# Patient Record
Sex: MALE | Race: BLACK OR AFRICAN AMERICAN | Employment: OTHER | URBAN - METROPOLITAN AREA
[De-identification: names, ages, dates, MRNs, and addresses within clinical notes are randomized per-mention and may not be internally consistent; named-entity substitution may affect disease eponyms.]

---

## 2018-07-25 ENCOUNTER — HOSPITAL ENCOUNTER (EMERGENCY)
Age: 53
Discharge: HOME | End: 2018-07-25
Attending: EMERGENCY MEDICINE
Payer: COMMERCIAL

## 2018-07-25 VITALS
RESPIRATION RATE: 14 BRPM | OXYGEN SATURATION: 99 % | TEMPERATURE: 98 F | DIASTOLIC BLOOD PRESSURE: 99 MMHG | HEIGHT: 70 IN | BODY MASS INDEX: 33.28 KG/M2 | SYSTOLIC BLOOD PRESSURE: 162 MMHG | HEART RATE: 72 BPM | WEIGHT: 232.5 LBS

## 2018-07-25 DIAGNOSIS — Z76.0 ENCOUNTER FOR MEDICATION REFILL: ICD-10-CM

## 2018-07-25 DIAGNOSIS — M54.50 ACUTE EXACERBATION OF CHRONIC LOW BACK PAIN: Primary | ICD-10-CM

## 2018-07-25 DIAGNOSIS — G89.29 ACUTE EXACERBATION OF CHRONIC LOW BACK PAIN: Primary | ICD-10-CM

## 2018-07-25 PROCEDURE — 6360000002 HC RX W HCPCS: Performed by: EMERGENCY MEDICINE

## 2018-07-25 PROCEDURE — 99283 EMERGENCY DEPT VISIT LOW MDM: CPT

## 2018-07-25 PROCEDURE — 96372 THER/PROPH/DIAG INJ SC/IM: CPT

## 2018-07-25 RX ORDER — KETOROLAC TROMETHAMINE 30 MG/ML
30 INJECTION, SOLUTION INTRAMUSCULAR; INTRAVENOUS ONCE
Status: COMPLETED | OUTPATIENT
Start: 2018-07-25 | End: 2018-07-25

## 2018-07-25 RX ORDER — NAPROXEN 500 MG/1
500 TABLET ORAL 2 TIMES DAILY
Qty: 14 TABLET | Refills: 0 | Status: SHIPPED | OUTPATIENT
Start: 2018-07-25 | End: 2020-11-20

## 2018-07-25 RX ADMIN — KETOROLAC TROMETHAMINE 30 MG: 30 INJECTION, SOLUTION INTRAMUSCULAR at 17:10

## 2018-07-25 ASSESSMENT — PAIN DESCRIPTION - PAIN TYPE
TYPE: CHRONIC PAIN
TYPE: CHRONIC PAIN

## 2018-07-25 ASSESSMENT — PAIN DESCRIPTION - LOCATION
LOCATION: BACK
LOCATION: BACK

## 2018-07-25 ASSESSMENT — PAIN SCALES - GENERAL
PAINLEVEL_OUTOF10: 10

## 2018-07-25 ASSESSMENT — PAIN DESCRIPTION - DESCRIPTORS
DESCRIPTORS: ACHING
DESCRIPTORS: ACHING

## 2018-07-25 NOTE — ED TRIAGE NOTES
Patient to ed requesting refills on his pain medications, reports he is in between doctors and has run out of his percocet.

## 2018-07-25 NOTE — ED PROVIDER NOTES
CHIEF COMPLAINT  Back Pain (chronic)      HISTORY OF PRESENT ILLNESS  Roise Harada  is a 46 y.o. male who presents to the ED at via private vehicle complaining of worsening low back pain. Patient has history of back problems and is out of his Percocet. He was last prescribed Percocet and March of this year. He was with [facility] pain management. He states he had medication left over which allowed him to get through until this month. He has not tried to follow-up with someone else though he had filed a grievance against [employer] in March at the time of his last visit. He states that he is planning on seeing a pain management specialist through [facility] system Possibly Mitul Morales. He complains of worsening pain but denies any trauma of late. Denies any trouble with bowel or bladder incontinence or inability to initiate his stream.  He denies any numbness of his perineum or rectum. He has a few of his gabapentin left over. He drove here. Previous diagnosis of his chronic back pain is:  Spondylosis of lumbar region without myelopathy or radiculopathy    Low back pain, unspecified back pain laterality, unspecified chronicity, with sciatica presence unspecified    There are no other complaints, modifying factors or associated symptoms. Nursing notes reviewed. Past medical history:  has a past medical history of Back pain; Diverticulitis; Headache; Hypertension; and Kidney calculi. Past surgical history:  has a past surgical history that includes Insertable Cardiac Monitor and Cardiac surgery. Home medications:   Prior to Admission medications    Medication Sig Start Date End Date Taking? Authorizing Provider   Gabapentin, Once-Daily, 600 MG TABS Take 600 mg by mouth 3 times daily for 30 days. . 7/25/18 8/24/18 Yes J Severiano Phalen, DO   naproxen (NAPROSYN) 500 MG tablet Take 1 tablet by mouth 2 times daily for 7 days 7/25/18 8/1/18 Yes J Severiano Phalen, DO   amitriptyline the low back. ED COURSE/MDM  Nursing notes reviewed. Pt was given the following medications or treatments in the ED: Patient was seen examined and he shows no evidence of cauda equina syndrome. This is chronic pain and what he is asking me to do I cannot do per the recommendation of the state of [place]. He was given Toradol injection here. He'll be given a prescription for his gabapentin and nonsteroidal anti-inflammatory medication. There is nothing further I can do for this gentleman and he'll have to seek a new pain management specialist.    Clinical Impression  Based on the presenting complaint, history, and physical exam, multiple diagnoses were considered. Exam and workup here most c/w:  1. Acute exacerbation of chronic low back pain    2. Encounter for medication refill        I discussed with Sirisha Dorsey the results of evaluation in the ED, diagnosis, care, and prognosis. The plan is to discharge to home. Patient is in agreement with plan and questions have been answered. I also discussed with Sirisha Dorsey the reasons which may require a return visit and the importance of follow-up care. The patient is well-appearing, nontoxic, and improved at the time of discharge. Patient agrees to call to arrange follow-up care as directed. Sirisha Willardnabeel understands to return immediately for worsening/change in symptoms. Patient will be started on the following medications from the ED:  Current Discharge Medication List            Disposition  Pt is discharged in stable condition.     Disposition Vitals:  BP (!) 162/99   Pulse 72   Temp 98 °F (36.7 °C)   Resp 14   Ht 5' 10\" (1.778 m)   Wt 105.5 kg (232 lb 8 oz)   SpO2 99%   BMI 33.36 kg/m²           Marisol Florentino DO  07/25/18 6375

## 2018-10-11 ENCOUNTER — HOSPITAL ENCOUNTER (EMERGENCY)
Age: 53
Discharge: HOME | End: 2018-10-11
Attending: EMERGENCY MEDICINE
Payer: COMMERCIAL

## 2018-10-11 VITALS
BODY MASS INDEX: 32.38 KG/M2 | HEIGHT: 71 IN | OXYGEN SATURATION: 97 % | DIASTOLIC BLOOD PRESSURE: 75 MMHG | TEMPERATURE: 97.9 F | WEIGHT: 231.26 LBS | HEART RATE: 64 BPM | SYSTOLIC BLOOD PRESSURE: 154 MMHG | RESPIRATION RATE: 20 BRPM

## 2018-10-11 DIAGNOSIS — M54.9 OTHER CHRONIC BACK PAIN: Primary | ICD-10-CM

## 2018-10-11 DIAGNOSIS — G89.29 OTHER CHRONIC BACK PAIN: Primary | ICD-10-CM

## 2018-10-11 PROCEDURE — 6370000000 HC RX 637 (ALT 250 FOR IP): Performed by: EMERGENCY MEDICINE

## 2018-10-11 PROCEDURE — 99283 EMERGENCY DEPT VISIT LOW MDM: CPT

## 2018-10-11 PROCEDURE — 6360000002 HC RX W HCPCS: Performed by: EMERGENCY MEDICINE

## 2018-10-11 RX ORDER — MELOXICAM 7.5 MG/1
7.5 TABLET ORAL DAILY
Qty: 10 TABLET | Refills: 0 | Status: SHIPPED | OUTPATIENT
Start: 2018-10-12 | End: 2022-06-18 | Stop reason: ALTCHOICE

## 2018-10-11 RX ORDER — LIDOCAINE 50 MG/G
1 PATCH TOPICAL DAILY
Qty: 6 PATCH | Refills: 0 | Status: SHIPPED | OUTPATIENT
Start: 2018-10-12 | End: 2018-10-18

## 2018-10-11 RX ORDER — ONDANSETRON 4 MG/1
4 TABLET, ORALLY DISINTEGRATING ORAL ONCE
Status: COMPLETED | OUTPATIENT
Start: 2018-10-11 | End: 2018-10-11

## 2018-10-11 RX ORDER — MELOXICAM 7.5 MG/1
7.5 TABLET ORAL ONCE
Status: COMPLETED | OUTPATIENT
Start: 2018-10-11 | End: 2018-10-11

## 2018-10-11 RX ORDER — LIDOCAINE 50 MG/G
1 PATCH TOPICAL ONCE
Status: DISCONTINUED | OUTPATIENT
Start: 2018-10-11 | End: 2018-10-11 | Stop reason: HOSPADM

## 2018-10-11 RX ADMIN — MELOXICAM 7.5 MG: 7.5 TABLET ORAL at 07:55

## 2018-10-11 RX ADMIN — ONDANSETRON 4 MG: 4 TABLET, ORALLY DISINTEGRATING ORAL at 07:55

## 2018-10-11 ASSESSMENT — PAIN DESCRIPTION - LOCATION
LOCATION: BACK
LOCATION: BACK

## 2018-10-11 ASSESSMENT — PAIN SCALES - GENERAL
PAINLEVEL_OUTOF10: 10
PAINLEVEL_OUTOF10: 10
PAINLEVEL_OUTOF10: 5

## 2018-10-11 ASSESSMENT — ENCOUNTER SYMPTOMS
ABDOMINAL PAIN: 0
SHORTNESS OF BREATH: 0
VOMITING: 0
NAUSEA: 1

## 2018-10-11 ASSESSMENT — PAIN DESCRIPTION - PAIN TYPE
TYPE: CHRONIC PAIN
TYPE: CHRONIC PAIN

## 2018-10-11 NOTE — ED PROVIDER NOTES
midline or paraspinal tenderness, step-offs, or deformity. 5/5 strength in all 4 extremities. Pt moves all fingers and toes. Distal light touch sensation intact. Ambulatory w/ normal gait. Negative straight leg raise test.  Neurological: He is alert and oriented to person, place, and time. He exhibits normal muscle tone. Coordination normal. GCS 15. No aphasia. No facial droop/ptosis/slurred speech. No nystagmus. Visual acuity & peripheral visual fields grossly intact. No pronator drift. 5/5 strength in ue/le b/l. Light touch sensation grossly intact over all 4 extremities. Normal gait. Skin: No rash noted. He is not diaphoretic. No erythema. No pallor. PROCEDURES   Procedures    DIAGNOSTIC RESULTS   DIAGNOSTICS:   Labs:  No results found for this or any previous visit (from the past 24 hour(s)). Imaging (radiologist interpretation):  No orders to display       EMERGENCY DEPARTMENT COURSE and DIFFERENTIAL DIAGNOSIS/MDM:   EMERGENCY DEPARTMENT COURSE AND TREATMENT:  Patient's condition improved  during Emergency Department evaluation. EMERGENCY DEPARTMENT MEDICAL DECISION MAKING:  After obtaining the patient's history, performing thephysical exam and reviewing the diagnostics, multiple  initial diagnoses were considered based on the presenting problem. Pt feels better in ED w/ mobic & zofran. Explained to pt that his BP elevated in ED. Pt says no h/a, vision changes, neuro deficits, chest pain, dyspnea, or decreased UOP. Pt says his BP becomes elevated when he is in pain. Counseled pt on importance of him following up closely in outpt setting w/ his PMD Dr Rubia Bauer about this & pt verbalized understanding. Pt says he is not currently on any antiHTN meds. Pt says he has established care w/ a different pain specialist, Dr Anna Amin.      reviewed; explained to pt that he has a number of recent rxs for controlled substances, so if he feels he needs these medications to control his pain, this is

## 2019-03-01 ENCOUNTER — HOSPITAL ENCOUNTER (EMERGENCY)
Age: 54
Discharge: HOME | End: 2019-03-01
Payer: COMMERCIAL

## 2019-03-01 VITALS
OXYGEN SATURATION: 100 % | DIASTOLIC BLOOD PRESSURE: 70 MMHG | WEIGHT: 229.28 LBS | HEART RATE: 80 BPM | SYSTOLIC BLOOD PRESSURE: 118 MMHG | TEMPERATURE: 98 F | RESPIRATION RATE: 18 BRPM | BODY MASS INDEX: 31.98 KG/M2

## 2019-03-01 DIAGNOSIS — M54.50 ACUTE EXACERBATION OF CHRONIC LOW BACK PAIN: Primary | ICD-10-CM

## 2019-03-01 DIAGNOSIS — G89.29 ACUTE EXACERBATION OF CHRONIC LOW BACK PAIN: Primary | ICD-10-CM

## 2019-03-01 PROCEDURE — 99283 EMERGENCY DEPT VISIT LOW MDM: CPT

## 2019-03-01 PROCEDURE — 96372 THER/PROPH/DIAG INJ SC/IM: CPT

## 2019-03-01 PROCEDURE — 6370000000 HC RX 637 (ALT 250 FOR IP): Performed by: PHYSICIAN ASSISTANT

## 2019-03-01 PROCEDURE — 6360000002 HC RX W HCPCS: Performed by: PHYSICIAN ASSISTANT

## 2019-03-01 RX ORDER — CYCLOBENZAPRINE HCL 10 MG
10 TABLET ORAL 3 TIMES DAILY PRN
Qty: 21 TABLET | Refills: 0 | Status: SHIPPED | OUTPATIENT
Start: 2019-03-01 | End: 2019-03-11

## 2019-03-01 RX ORDER — OXYCODONE HYDROCHLORIDE 5 MG/1
5 TABLET ORAL ONCE
Status: COMPLETED | OUTPATIENT
Start: 2019-03-01 | End: 2019-03-01

## 2019-03-01 RX ORDER — DEXAMETHASONE SODIUM PHOSPHATE 4 MG/ML
10 INJECTION, SOLUTION INTRA-ARTICULAR; INTRALESIONAL; INTRAMUSCULAR; INTRAVENOUS; SOFT TISSUE ONCE
Status: COMPLETED | OUTPATIENT
Start: 2019-03-01 | End: 2019-03-01

## 2019-03-01 RX ORDER — PREDNISONE 20 MG/1
TABLET ORAL
Qty: 27 TABLET | Refills: 0 | Status: SHIPPED | OUTPATIENT
Start: 2019-03-01 | End: 2020-11-08 | Stop reason: ALTCHOICE

## 2019-03-01 RX ADMIN — OXYCODONE HYDROCHLORIDE 5 MG: 5 TABLET ORAL at 08:17

## 2019-03-01 RX ADMIN — DEXAMETHASONE SODIUM PHOSPHATE 10 MG: 4 INJECTION, SOLUTION INTRAMUSCULAR; INTRAVENOUS at 07:14

## 2019-03-01 RX ADMIN — HYDROMORPHONE HYDROCHLORIDE 1 MG: 1 INJECTION, SOLUTION INTRAMUSCULAR; INTRAVENOUS; SUBCUTANEOUS at 07:14

## 2019-03-01 ASSESSMENT — ENCOUNTER SYMPTOMS
EYE PAIN: 0
NAUSEA: 0
ABDOMINAL PAIN: 0
SHORTNESS OF BREATH: 0
VOMITING: 0
DIARRHEA: 0
BACK PAIN: 1
COUGH: 0

## 2019-03-01 ASSESSMENT — PAIN DESCRIPTION - FREQUENCY
FREQUENCY: CONTINUOUS
FREQUENCY: CONTINUOUS

## 2019-03-01 ASSESSMENT — PAIN DESCRIPTION - PAIN TYPE
TYPE: ACUTE PAIN;CHRONIC PAIN
TYPE: ACUTE PAIN;CHRONIC PAIN
TYPE: CHRONIC PAIN;ACUTE PAIN

## 2019-03-01 ASSESSMENT — PAIN DESCRIPTION - LOCATION
LOCATION: BACK
LOCATION: BACK

## 2019-03-01 ASSESSMENT — PAIN DESCRIPTION - ORIENTATION: ORIENTATION: RIGHT

## 2019-03-01 ASSESSMENT — PAIN SCALES - GENERAL
PAINLEVEL_OUTOF10: 10
PAINLEVEL_OUTOF10: 3
PAINLEVEL_OUTOF10: 5
PAINLEVEL_OUTOF10: 10
PAINLEVEL_OUTOF10: 7

## 2019-03-01 ASSESSMENT — PAIN DESCRIPTION - DESCRIPTORS
DESCRIPTORS: SHARP
DESCRIPTORS: SHARP

## 2020-11-08 ENCOUNTER — HOSPITAL ENCOUNTER (EMERGENCY)
Age: 55
Discharge: HOME | End: 2020-11-08
Payer: COMMERCIAL

## 2020-11-08 VITALS
DIASTOLIC BLOOD PRESSURE: 78 MMHG | HEIGHT: 71 IN | WEIGHT: 224.43 LBS | OXYGEN SATURATION: 98 % | HEART RATE: 72 BPM | RESPIRATION RATE: 16 BRPM | SYSTOLIC BLOOD PRESSURE: 134 MMHG | TEMPERATURE: 98.1 F | BODY MASS INDEX: 31.42 KG/M2

## 2020-11-08 PROCEDURE — 99284 EMERGENCY DEPT VISIT MOD MDM: CPT

## 2020-11-08 PROCEDURE — 12002 RPR S/N/AX/GEN/TRNK2.6-7.5CM: CPT

## 2020-11-08 PROCEDURE — 6370000000 HC RX 637 (ALT 250 FOR IP): Performed by: NURSE PRACTITIONER

## 2020-11-08 RX ORDER — OXYCODONE HYDROCHLORIDE AND ACETAMINOPHEN 5; 325 MG/1; MG/1
2 TABLET ORAL ONCE
Status: COMPLETED | OUTPATIENT
Start: 2020-11-08 | End: 2020-11-08

## 2020-11-08 RX ORDER — CEPHALEXIN 500 MG/1
500 CAPSULE ORAL 4 TIMES DAILY
Qty: 40 CAPSULE | Refills: 0 | Status: SHIPPED | OUTPATIENT
Start: 2020-11-08 | End: 2020-11-13

## 2020-11-08 RX ADMIN — OXYCODONE HYDROCHLORIDE AND ACETAMINOPHEN 2 TABLET: 5; 325 TABLET ORAL at 21:29

## 2020-11-08 ASSESSMENT — PAIN SCALES - GENERAL
PAINLEVEL_OUTOF10: 8
PAINLEVEL_OUTOF10: 0
PAINLEVEL_OUTOF10: 7

## 2020-11-08 ASSESSMENT — PAIN DESCRIPTION - DESCRIPTORS: DESCRIPTORS: ACHING;THROBBING

## 2020-11-08 ASSESSMENT — PAIN DESCRIPTION - PROGRESSION: CLINICAL_PROGRESSION: NOT CHANGED

## 2020-11-08 ASSESSMENT — PAIN DESCRIPTION - ORIENTATION: ORIENTATION: RIGHT

## 2020-11-08 ASSESSMENT — PAIN - FUNCTIONAL ASSESSMENT
PAIN_FUNCTIONAL_ASSESSMENT: PREVENTS OR INTERFERES WITH MANY ACTIVE NOT PASSIVE ACTIVITIES
PAIN_FUNCTIONAL_ASSESSMENT: 0-10

## 2020-11-08 ASSESSMENT — PAIN DESCRIPTION - LOCATION: LOCATION: HAND

## 2020-11-08 ASSESSMENT — PAIN DESCRIPTION - FREQUENCY: FREQUENCY: CONTINUOUS

## 2020-11-08 ASSESSMENT — PAIN DESCRIPTION - PAIN TYPE: TYPE: ACUTE PAIN

## 2020-11-09 NOTE — ED PROVIDER NOTES
[address]  [address] [address]  Dept: [phone]  Loc: [address] ENCOUNTER        This patient was not seen or evaluated by the attending physician. I evaluated this patient, the attending physician was available for consultation. CHIEF COMPLAINT    Chief Complaint   Patient presents with    Laceration     cut right index finger on home glass this morning. Bleeding is controled        HPI    Beatriz Chadwick is a 63 y.o. male who lacerated the right index finger early this morning. There was broken glass in the sink and he cut his finger on this. He is right-hand dominant. He is on no anticoagulation. He denies limited range of motion to the finger. He states as a day is gone on he thought he should get it checked out. He does not want stitches. All he wants is a fresh bandage. His last tetanus was 2 years ago. He denies paresthesias or numbness to the finger.     REVIEW OF SYSTEMS    Neurologic: No numbness or weakness distal to the wound  Skin: see HPI  Musculoskeletal: No bony deformity  Immunization: Tetanus status will be updated in the ED    PAST MEDICAL & SURGICAL HISTORY    Past Medical History:   Diagnosis Date    Back pain     Diverticulitis     Headache     Hypertension     Kidney calculi      Past Surgical History:   Procedure Laterality Date    CARDIAC SURGERY      INSERTABLE CARDIAC MONITOR         CURRENT MEDICATIONS    Current Outpatient Rx   Medication Sig Dispense Refill    Cyclobenzaprine HCl (FLEXERIL PO) Take by mouth      cephALEXin (KEFLEX) 500 MG capsule Take 1 capsule by mouth 4 times daily for 5 days 40 capsule 0    amitriptyline (ELAVIL) 25 MG tablet Take 25 mg by mouth nightly      oxyCODONE-acetaminophen (PERCOCET)  MG per tablet Take 1 tablet by mouth every 6 hours as needed for Pain 12 tablet 0    LISINOPRIL PO Take by mouth      meloxicam (MOBIC) 7.5 MG tablet Take 1 tablet by mouth daily for 10 days 10 tablet 0    Gabapentin, Once-Daily, 600 MG TABS Take 600 mg by mouth 3 times daily for 30 days. . 90 tablet 0    naproxen (NAPROSYN) 500 MG tablet Take 1 tablet by mouth 2 times daily for 7 days 14 tablet 0    UNKNOWN TO PATIENT          ALLERGIES    No Known Allergies    SOCIAL & FAMILY HISTORY    Social History     Socioeconomic History    Marital status:      Spouse name: None    Number of children: None    Years of education: None    Highest education level: None   Occupational History    None   Social Needs    Financial resource strain: None    Food insecurity     Worry: None     Inability: None    Transportation needs     Medical: None     Non-medical: None   Tobacco Use    Smoking status: Current Every Day Smoker     Packs/day: 1.00     Types: Cigarettes    Smokeless tobacco: Never Used   Substance and Sexual Activity    Alcohol use: No    Drug use: No    Sexual activity: Yes     Partners: Female     Comment: per pt   Lifestyle    Physical activity     Days per week: None     Minutes per session: None    Stress: None   Relationships    Social connections     Talks on phone: None     Gets together: None     Attends Mandaeism service: None     Active member of club or organization: None     Attends meetings of clubs or organizations: None     Relationship status: None    Intimate partner violence     Fear of current or ex partner: None     Emotionally abused: None     Physically abused: None     Forced sexual activity: None   Other Topics Concern    None   Social History Narrative    None     History reviewed. No pertinent family history.     PHYSICAL EXAM    VITAL SIGNS: /78   Pulse 72   Temp 98.1 °F (36.7 °C) (Temporal)   Resp 16   Ht 5' 11\" (1.803 m)   Wt 224 lb 6.9 oz (101.8 kg)   SpO2 98%   BMI 31.30 kg/m²   Constitutional:  Well developed, well-nourished  HENT:  atraumatic, no trismus  NECK: Supple, No neck swelling  Respiratory:  No respiratory distress  Cardiovascular:  No JVD   Neurologic: Motor and sensory distal to the wound is intact and normal, patient is awake, alert, no slurred speech  Vascular:  Radial pulses 2+ with a brisk capillary refill to all fingers. Musculoskeletal:  No edema or deformity. He has full range of motion with flexion and extension against resistance to the DIP, PIP and MCP to the left index finger. Integument:  +2x2.2 cm irregular superficial skin flap. It is nonbleeding. There is no foreign body in the wound, there is no tendon or bone exposed in the wound. Lac Repair    Date/Time: 11/8/2020 9:34 PM  Performed by: LAISHA Arteaga CNP  Authorized by: LAISHA Arteaga CNP     Consent:     Consent obtained:  Verbal    Consent given by:  Patient    Risks discussed:  Infection, pain, retained foreign body, poor cosmetic result, poor wound healing and need for additional repair  Anesthesia (see MAR for exact dosages): Anesthesia method:  None  Laceration details:     Location:  Finger    Finger location:  R index finger    Wound length (cm): 2x2.2 cm. Repair type:     Repair type:  Simple  Pre-procedure details:     Preparation:  Patient was prepped and draped in usual sterile fashion  Exploration:     Hemostasis obtained with: non-bleeding.     Wound exploration: wound explored through full range of motion and entire depth of wound probed and visualized      Wound extent: no fascia violation noted, no foreign bodies/material noted and no tendon damage noted      Contaminated: no    Treatment:     Area cleansed with:  Saline (chlorhexadine and )    Amount of cleaning:  Standard    Irrigation solution:  Sterile saline    Irrigation method:  Pressure wash    Visualized foreign bodies/material removed: no    Skin repair:     Repair method:  Steri-Strips    Number of Steri-Strips:  4  Approximation:     Approximation:  Close  Post-procedure details:     Dressing:  Antibiotic ointment, splint for protection and non-adherent dressing    Patient tolerance of procedure: Tolerated well, no immediate complications      RADIOLOGY  No orders to display     Labs Reviewed - No data to display      ED COURSE & MEDICAL DECISION MAKING    See chart for details of any medications ordered  Medications   oxyCODONE-acetaminophen (PERCOCET) 5-325 MG per tablet 2 tablet (2 tablets Oral Given 11/8/20 2129)       I have seen and evaluated this patient. My attending physician was available for consultation. Differential diagnosis includes but is not limited to tendon laceration, neurologic injury, vascular injury, involvement of bone that could lead to osteomyelitis, retained foreign body, delayed bacterial skin infection, other. He is nontoxic in appearance and hemodynamically stable. There is no evidence of neurovascular injury on my exam.  No evidence of tendon laceration. He did not want it repaired with sutures. It is not bleeding. I cleansed it with normal saline and chlorhexidine. I Steri-Stripped it. I informed the patient that the skin flap may not take and will fall off. Signs and symptoms of infection were reviewed with the patient. I did place him on a short course of Keflex prophylactically since that is been more than 12 hours and his hands were in dirty water when he cut his finger. I placed him in a finger splint to help keep the Steri-Strips in place. He was instructed to return to the emergency department for worsening symptoms otherwise he can follow-up with his primary care physician. The patient verbalized understanding of the discharge instructions. FINAL IMPRESSION    1.  Laceration of right index finger without foreign body without damage to nail, initial encounter        PLAN  Discharge with outpatient follow-up (see EMR)      (Please note that this note was completed with a voice recognition program.  Every attempt was made to

## 2020-11-19 VITALS
SYSTOLIC BLOOD PRESSURE: 142 MMHG | OXYGEN SATURATION: 95 % | HEART RATE: 110 BPM | TEMPERATURE: 98.8 F | RESPIRATION RATE: 18 BRPM | DIASTOLIC BLOOD PRESSURE: 92 MMHG

## 2020-11-19 PROCEDURE — 99283 EMERGENCY DEPT VISIT LOW MDM: CPT

## 2020-11-19 ASSESSMENT — PAIN SCALES - GENERAL: PAINLEVEL_OUTOF10: 8

## 2020-11-19 ASSESSMENT — PAIN DESCRIPTION - LOCATION: LOCATION: FINGER (COMMENT WHICH ONE)

## 2020-11-19 ASSESSMENT — PAIN DESCRIPTION - ORIENTATION: ORIENTATION: RIGHT

## 2020-11-19 ASSESSMENT — PAIN DESCRIPTION - PAIN TYPE: TYPE: ACUTE PAIN

## 2020-11-19 ASSESSMENT — PAIN DESCRIPTION - DESCRIPTORS: DESCRIPTORS: ACHING

## 2020-11-20 ENCOUNTER — HOSPITAL ENCOUNTER (EMERGENCY)
Age: 55
Discharge: HOME | End: 2020-11-20
Attending: EMERGENCY MEDICINE
Payer: COMMERCIAL

## 2020-11-20 PROCEDURE — 6370000000 HC RX 637 (ALT 250 FOR IP): Performed by: EMERGENCY MEDICINE

## 2020-11-20 RX ORDER — CLINDAMYCIN HYDROCHLORIDE 300 MG/1
300 CAPSULE ORAL 4 TIMES DAILY
Qty: 40 CAPSULE | Refills: 0 | Status: SHIPPED | OUTPATIENT
Start: 2020-11-20 | End: 2020-11-30

## 2020-11-20 RX ORDER — NAPROXEN 500 MG/1
500 TABLET ORAL 2 TIMES DAILY PRN
Qty: 20 TABLET | Refills: 0 | Status: SHIPPED | OUTPATIENT
Start: 2020-11-20 | End: 2022-06-18 | Stop reason: ALTCHOICE

## 2020-11-20 RX ORDER — LIDOCAINE 4 G/G
1 PATCH TOPICAL ONCE
Status: DISCONTINUED | OUTPATIENT
Start: 2020-11-20 | End: 2020-11-20

## 2020-11-20 RX ORDER — HYDROCODONE BITARTRATE AND ACETAMINOPHEN 5; 325 MG/1; MG/1
1 TABLET ORAL ONCE
Status: DISCONTINUED | OUTPATIENT
Start: 2020-11-20 | End: 2020-11-20

## 2020-11-20 RX ORDER — HYDROCODONE BITARTRATE AND ACETAMINOPHEN 5; 325 MG/1; MG/1
1 TABLET ORAL ONCE
Status: COMPLETED | OUTPATIENT
Start: 2020-11-20 | End: 2020-11-20

## 2020-11-20 RX ORDER — CYCLOBENZAPRINE HCL 10 MG
10 TABLET ORAL ONCE
Status: DISCONTINUED | OUTPATIENT
Start: 2020-11-20 | End: 2020-11-20

## 2020-11-20 RX ADMIN — HYDROCODONE BITARTRATE AND ACETAMINOPHEN 1 TABLET: 5; 325 TABLET ORAL at 01:44

## 2020-11-20 ASSESSMENT — ENCOUNTER SYMPTOMS
DIARRHEA: 0
CONSTIPATION: 0
BACK PAIN: 0
VOMITING: 0
RECTAL PAIN: 0
EYE DISCHARGE: 0
ABDOMINAL DISTENTION: 0
WHEEZING: 0
EYE ITCHING: 0
PHOTOPHOBIA: 0
CHOKING: 0
EYE PAIN: 0
NAUSEA: 0
CHEST TIGHTNESS: 0
APNEA: 0
BLOOD IN STOOL: 0
ABDOMINAL PAIN: 0
EYE REDNESS: 0
SHORTNESS OF BREATH: 0
COUGH: 0
ANAL BLEEDING: 0
COLOR CHANGE: 0
STRIDOR: 0

## 2020-11-20 ASSESSMENT — PAIN DESCRIPTION - ORIENTATION: ORIENTATION: RIGHT

## 2020-11-20 ASSESSMENT — PAIN DESCRIPTION - PAIN TYPE: TYPE: ACUTE PAIN

## 2020-11-20 ASSESSMENT — PAIN DESCRIPTION - LOCATION: LOCATION: FINGER (COMMENT WHICH ONE)

## 2020-11-20 ASSESSMENT — PAIN SCALES - GENERAL
PAINLEVEL_OUTOF10: 4
PAINLEVEL_OUTOF10: 10

## 2020-11-20 NOTE — ED PROVIDER NOTES
[address] [facility]      Pt Name: Federica Newsome  MRN: [id number]  [place] 1965  Date of evaluation: 11/19/2020  Provider: Flavia Head MD    CHIEF COMPLAINT       Chief Complaint   Patient presents with    Hand Pain     R hand, pointer finger pain, states had laceration repair on [place]    Federica Newsome is a 63 y.o. male who presents to the emergency department with finger pain. Had laceration to finger 12 days prior. Refused stitches at that time. Endorses continued pain. Pain is acute 9/10 sharp and constant in nature. Has been taking OTC medications with minimal relief. Nothing makes symptoms better but movement makes symptoms worse. This has never happened before. No other associated symptoms other than previously mentioned. Nursing Notes were reviewed. Including nursing noted for FM, Surgical History, Past Medical History, Social History, vitals, and allergies; agree with all. REVIEW OF SYSTEMS       Review of Systems   Constitutional: Negative for activity change, appetite change, chills, diaphoresis, fatigue, fever and unexpected weight change. HENT: Negative for congestion, dental problem, drooling, ear discharge and ear pain. Eyes: Negative for photophobia, pain, discharge, redness, itching and visual disturbance. Respiratory: Negative for apnea, cough, choking, chest tightness, shortness of breath, wheezing and stridor. Cardiovascular: Negative for chest pain, palpitations and leg swelling. Gastrointestinal: Negative for abdominal distention, abdominal pain, anal bleeding, blood in stool, constipation, diarrhea, nausea, rectal pain and vomiting. Endocrine: Negative for cold intolerance and heat intolerance. Genitourinary: Negative for decreased urine volume and urgency. Musculoskeletal: Negative for arthralgias and back pain. Skin: Positive for wound.  Negative for color change and pallor. Neurological: Negative for dizziness and facial asymmetry. Hematological: Negative for adenopathy. Does not bruise/bleed easily. Psychiatric/Behavioral: Negative for agitation, behavioral problems, confusion and decreased concentration. Except as noted above the remainder of the review of systems was reviewed and negative. PAST MEDICAL HISTORY     Past Medical History:   Diagnosis Date    Back pain     Diverticulitis     Headache     Hypertension     Kidney calculi        SURGICAL HISTORY       Past Surgical History:   Procedure Laterality Date    CARDIAC SURGERY      INSERTABLE CARDIAC MONITOR         CURRENT MEDICATIONS       Discharge Medication List as of 11/20/2020  1:36 AM      CONTINUE these medications which have NOT CHANGED    Details   Cyclobenzaprine HCl (FLEXERIL PO) Take by mouthHistorical Med      meloxicam (MOBIC) 7.5 MG tablet Take 1 tablet by mouth daily for 10 days, Disp-10 tablet, R-0Print      Gabapentin, Once-Daily, 600 MG TABS Take 600 mg by mouth 3 times daily for 30 days. ., Disp-90 tablet, R-0Print      amitriptyline (ELAVIL) 25 MG tablet Take 25 mg by mouth nightlyHistorical Med      oxyCODONE-acetaminophen (PERCOCET)  MG per tablet Take 1 tablet by mouth every 6 hours as needed for Pain, Disp-12 tablet, R-0      LISINOPRIL PO Take by mouth      UNKNOWN TO PATIENT Historical Med             ALLERGIES     Patient has no known allergies. FAMILY HISTORY      History reviewed. No pertinent family history. SOCIAL HISTORY       Social History     Socioeconomic History    Marital status:       Spouse name: None    Number of children: None    Years of education: None    Highest education level: None   Occupational History    None   Social Needs    Financial resource strain: None    Food insecurity     Worry: None     Inability: None    Transportation needs     Medical: None     Non-medical: None   Tobacco Use    Smoking status: Current Every Day Smoker     Packs/day: 0.50     Types: Cigarettes    Smokeless tobacco: Never Used   Substance and Sexual Activity    Alcohol use: No    Drug use: No    Sexual activity: Yes     Partners: Female     Comment: per pt   Lifestyle    Physical activity     Days per week: None     Minutes per session: None    Stress: None   Relationships    Social connections     Talks on phone: None     Gets together: None     Attends Presybeterian service: None     Active member of club or organization: None     Attends meetings of clubs or organizations: None     Relationship status: None    Intimate partner violence     Fear of current or ex partner: None     Emotionally abused: None     Physically abused: None     Forced sexual activity: None   Other Topics Concern    None   Social History Narrative    None       PHYSICAL EXAM       ED Triage Vitals [11/19/20 2242]   BP Temp Temp Source Pulse Resp SpO2 Height Weight   (!) 142/92 98.8 °F (37.1 °C) Temporal 110 18 95 % -- --       Physical Exam  Vitals signs and nursing note reviewed. Constitutional:       General: He is not in acute distress. Appearance: He is well-developed. He is not diaphoretic. HENT:      Head: Normocephalic and atraumatic. Eyes:      General:         Right eye: No discharge. Left eye: No discharge. Pupils: Pupils are equal, round, and reactive to light. Neck:      Musculoskeletal: Normal range of motion. Thyroid: No thyromegaly. Trachea: No tracheal deviation. Cardiovascular:      Rate and Rhythm: Normal rate and regular rhythm. Heart sounds: No murmur. Pulmonary:      Breath sounds: No wheezing or rales. Chest:      Chest wall: No tenderness. Abdominal:      General: There is no distension. Palpations: Abdomen is soft. There is no mass. Tenderness: There is no abdominal tenderness. There is no guarding or rebound. Musculoskeletal: Normal range of motion.          General: No tenderness or deformity. Skin:     General: Skin is warm. Comments: Heeling laceration to index finger right hand without stiches but with steri strips. No signs of redness or pus or infection. Full range of motion MCP, DIP, PIP. Neurological:      Mental Status: He is alert. Cranial Nerves: No cranial nerve deficit. Motor: No abnormal muscle tone. Coordination: Coordination normal.         DIAGNOSTIC RESULTS     EKG: All EKG's are interpreted by the Emergency Department Physician who either signs or Co-signs this chart in the absence of acardiologist.    None    RADIOLOGY:   Non-plain film images such as CT, Ultrasoundand MRI are read by the radiologist. Plain radiographic images are visualized and preliminarily interpreted by the emergency physician with the below findings:    None    ED BEDSIDE ULTRASOUND:   Performed by ED Physician - none    LABS:  Labs Reviewed - No data to display    All other labs were withinnormal range or not returned as of this dictation. EMERGENCY DEPARTMENT COURSE and DIFFERENTIAL DIAGNOSIS/MDM:     PMH, Surgical Hx, FH, Social Hx reviewed by myself (ETOH usage, Tobacco usage, Drug usage reviewed by myself, no pertinent Hx)- No Pertinent Hx     Old records were reviewed by me    Yair Corbett started for prophylaxis. Appears to be heeling appropriately. I estimate there is LOW risk for Sepsis, MI, Stroke, Tamponade, PTX, Toxicity or other life threatening etiology thus I consider the discharge disposition reasonable. The patient is at low risk for mortality based on demographic, history and clinical factors. Given the best available information and clinical assessment, I estimate the risk of hospitalization to be greater than risk of treatment at home. I have explained to the patient that the risk could rapidly change, given precautions for return and instructions. Explained to patient that the risk for mortality is low based on demographic, history and clinical factors. I discussed with patient the results of evaluation in the ED, diagnosis, care, and prognosis. The plan is to discharge to home. Patient is in agreement with plan and questions have been answered. I also discussed with patient the reasons which may require a return visit and the importance of follow-up care. The patient is well-appearing, nontoxic, and improved at the time of discharge. Patient agrees to call to arrange follow-up care as directed. Patient understands to return immediately for worsening/change in symptoms. CRITICAL CARE TIME   Total Critical Caretime was 21 minutes, excluding separately reportable procedures. There was a high probability of clinically significant/life threatening deterioration in the patient's condition which required my urgent intervention. PROCEDURES:  Unlessotherwise noted below, none    FINAL IMPRESSION      1. Hand pain, right          DISPOSITION/PLAN   DISPOSITION Decision To Discharge 11/20/2020 01:24:05 AM    PATIENT REFERRED TO:  No follow-up provider specified.     DISCHARGE MEDICATIONS:  Discharge Medication List as of 11/20/2020  1:36 AM      START taking these medications    Details   clindamycin (CLEOCIN) 300 MG capsule Take 1 capsule by mouth 4 times daily for 10 days, Disp-40 capsule,R-0Print                (Please note that portions ofthis note were completed with a voice recognition program.  Efforts were made to edit the dictations but occasionally words are mis-transcribed.)    Melinda Turner MD(electronically signed)  Attending Emergency Physician        Melinda Turner MD  11/20/20 9760

## 2020-11-20 NOTE — ED NOTES
Discharge and education instructions reviewed. Patient verbalized understanding, teach-back successful. Patient denied questions at this time. No acute distress noted. Patient instructed to follow-up as noted - return to emergency department if symptoms worsen. Patient verbalized understanding. Discharged per EDMD with discharged instructions.        Sara Cannon RN  11/20/20 6889

## 2022-06-18 ENCOUNTER — HOSPITAL ENCOUNTER (EMERGENCY)
Age: 57
Discharge: HOME | End: 2022-06-18
Attending: EMERGENCY MEDICINE
Payer: MEDICARE

## 2022-06-18 VITALS
HEART RATE: 69 BPM | HEIGHT: 71 IN | TEMPERATURE: 98.8 F | WEIGHT: 243.56 LBS | SYSTOLIC BLOOD PRESSURE: 176 MMHG | RESPIRATION RATE: 16 BRPM | OXYGEN SATURATION: 98 % | DIASTOLIC BLOOD PRESSURE: 95 MMHG | BODY MASS INDEX: 34.1 KG/M2

## 2022-06-18 DIAGNOSIS — M54.50 ACUTE EXACERBATION OF CHRONIC LOW BACK PAIN: Primary | ICD-10-CM

## 2022-06-18 DIAGNOSIS — G89.29 ACUTE EXACERBATION OF CHRONIC LOW BACK PAIN: Primary | ICD-10-CM

## 2022-06-18 PROCEDURE — 99284 EMERGENCY DEPT VISIT MOD MDM: CPT

## 2022-06-18 PROCEDURE — 6360000002 HC RX W HCPCS: Performed by: EMERGENCY MEDICINE

## 2022-06-18 PROCEDURE — 96372 THER/PROPH/DIAG INJ SC/IM: CPT

## 2022-06-18 PROCEDURE — 6370000000 HC RX 637 (ALT 250 FOR IP): Performed by: EMERGENCY MEDICINE

## 2022-06-18 RX ORDER — KETOROLAC TROMETHAMINE 30 MG/ML
60 INJECTION, SOLUTION INTRAMUSCULAR; INTRAVENOUS ONCE
Status: COMPLETED | OUTPATIENT
Start: 2022-06-18 | End: 2022-06-18

## 2022-06-18 RX ORDER — OXYCODONE HYDROCHLORIDE AND ACETAMINOPHEN 5; 325 MG/1; MG/1
2 TABLET ORAL ONCE
Status: COMPLETED | OUTPATIENT
Start: 2022-06-18 | End: 2022-06-18

## 2022-06-18 RX ORDER — LIDOCAINE 50 MG/G
1 PATCH TOPICAL DAILY PRN
Qty: 15 PATCH | Refills: 0 | Status: SHIPPED | OUTPATIENT
Start: 2022-06-18 | End: 2022-07-03

## 2022-06-18 RX ORDER — PREDNISONE 20 MG/1
60 TABLET ORAL DAILY
Qty: 15 TABLET | Refills: 0 | Status: SHIPPED | OUTPATIENT
Start: 2022-06-18 | End: 2022-06-23

## 2022-06-18 RX ADMIN — OXYCODONE AND ACETAMINOPHEN 2 TABLET: 5; 325 TABLET ORAL at 16:51

## 2022-06-18 RX ADMIN — KETOROLAC TROMETHAMINE 60 MG: 30 INJECTION, SOLUTION INTRAMUSCULAR at 16:51

## 2022-06-18 ASSESSMENT — PAIN DESCRIPTION - FREQUENCY
FREQUENCY: CONTINUOUS
FREQUENCY: CONTINUOUS

## 2022-06-18 ASSESSMENT — PAIN DESCRIPTION - LOCATION
LOCATION: BACK;LEG

## 2022-06-18 ASSESSMENT — PAIN DESCRIPTION - DESCRIPTORS
DESCRIPTORS: DISCOMFORT

## 2022-06-18 ASSESSMENT — PAIN - FUNCTIONAL ASSESSMENT
PAIN_FUNCTIONAL_ASSESSMENT: 0-10
PAIN_FUNCTIONAL_ASSESSMENT: 0-10

## 2022-06-18 ASSESSMENT — PAIN SCALES - GENERAL
PAINLEVEL_OUTOF10: 10
PAINLEVEL_OUTOF10: 7
PAINLEVEL_OUTOF10: 10

## 2022-06-18 ASSESSMENT — PAIN DESCRIPTION - ORIENTATION
ORIENTATION: LEFT;LOWER
ORIENTATION: LEFT
ORIENTATION: LEFT;LOWER

## 2022-06-18 ASSESSMENT — PAIN DESCRIPTION - PAIN TYPE
TYPE: ACUTE PAIN
TYPE: ACUTE PAIN

## 2022-06-18 NOTE — ED NOTES
Pt states that he fell last Monday and was seen at [facility] and had xrays of his back done and pt sates that for the past 2 days his lower back pain on the left side has gotten more severe and now it is going down his left leg and he is having pain/numbness to the outer thigh of left leg.       Dolores Gonzalez RN  06/18/22 6918

## 2022-06-21 NOTE — ED PROVIDER NOTES
TRIAGE CHIEF COMPLAINT:   Chief Complaint   Patient presents with    Fall     pt states that he fell on monday and was seen at [facility] hospital and had xrays done of back and now having left leg pain/numbness to left leg and lower back pain         HPI: Beatriz Chadwick is a 61 y.o. male who presents to the Emergency Department with complaint of lower back pain and new left leg tingling/numbness. Patient fell Monday landing on his buttocks. He was seen on June 13 at [facility] where he had x-rays of his lumbar spine that showed previous fusion of L4-5 and no fracture. Since that visit he has developed some pain that goes down the left leg. Denies sensory loss. No bowel or bladder complaint. The patient has chronic lower back pain and is on Percocet. He received 28 tablets of Percocet 10 mg on Felicia 15 of this year. He takes 3-4 doses a day and last took a dose at 1 PM.  He apparently has some chronic left-sided foot drop related to his previous back surgery. REVIEW OF SYSTEMS:  6 systems reviewed. Pertinent positives per HPI. Otherwise noted to be negative. Nursing notes reviewed and agree with above. Past medical/surgical history reviewed. MEDICATIONS   Discharge Medication List as of 6/18/2022  4:45 PM      START taking these medications    Details   lidocaine (LIDODERM) 5 % Place 1 patch onto the skin daily as needed for Pain 12 hours on, 12 hours off., Disp-15 patch, R-0Normal      predniSONE (DELTASONE) 20 MG tablet Take 3 tablets by mouth daily for 5 doses With food, Disp-15 tablet, R-0Normal         CONTINUE these medications which have NOT CHANGED    Details   Cyclobenzaprine HCl (FLEXERIL PO) Take by mouthHistorical Med      Gabapentin, Once-Daily, 600 MG TABS Take 600 mg by mouth 3 times daily for 30 days. ., Disp-90 tablet, R-0Print      amitriptyline (ELAVIL) 25 MG tablet Take 25 mg by mouth nightlyHistorical Med      oxyCODONE-acetaminophen (PERCOCET)  MG per tablet Take 1 tablet by mouth every 6 hours as needed for Pain, Disp-12 tablet, R-0      LISINOPRIL PO Take by mouth      UNKNOWN TO PATIENT Historical Med               ALLERGIES No Known Allergies      BP (!) 176/95   Pulse 69   Temp 98.8 °F (37.1 °C) (Oral)   Resp 16   Ht 5' 11\" (1.803 m)   Wt 243 lb 9 oz (110.5 kg)   SpO2 98%   BMI 33.97 kg/m²   General:  No acute distress. Non toxic appearance  Head:   Normocephalic and atraumatic  Eyes:   Conjunctiva clear, MEGHAN, EOM's intact. Sclera anicteric. ENT:   Mucous membranes moist  Neck:   Supple. No adenopathy. Lungs/Chest:  No respiratory distress  CVS:   Regular rate and rhythm  Abdomen: Bowel sounds normal.  Soft and nontender. Extremities:  Full range of motion  Skin:   No rashes or lesions to exposed skin  Back:   He has some tenderness in the paralumbar area left greater than right. No bony tenderness. Straight leg raise is negative to 80 degrees bilaterally. He has slight foot drop on the left. Effort is poor with testing. No gross weakness in the quadriceps, hamstrings or gastrocnemius. He has normal sensation to light touch bilaterally. Reflexes are 2+ and symmetrical.  Neuro:  Alert and OX3. Speech clear and appropriate. No upper/lower extremity weakness. Normal sensation in all extremities. No facial asymmetry or weakness. Gait normal.  Psych:   Affect normal. Mood normal        RADIOLOGY:      LAB      ED COURSE / MDM:  59-year-old male with acute exacerbation of chronic lower back pain after falling landing on his buttocks. He was seen at [facility] on June 13 and had negative x-rays. He was referred to a spine surgeon. Presents here now stating that the pain is going down his left leg and he has some numbness and tingling. There may be an element of sciatica. No new neurologic findings based on patient's history and old record. He was medicated here with Percocet and Toradol IM.   He was given prescription for Lidoderm pain patches and prednisone and advised to follow-up with his spine surgeon or chronic pain doctor. I discussed with Chinoneelima Bunnmanuel the results of the evaluation in the Emergency Department, diagnosis, care, prognosis and the importance of follow-up. The patient is stable for discharge. The patient and/or family are in agreement with the plan and all questions have been answered. Specific discharge instructions were explained, including reasons to return to the emergency department.       (Please note that portions of this note may have been completed with a voice recognition program.  Efforts were made to edit the dictation but occasionally words are mis-transcribed)        FINAL IMPRESSION:  1 --acute exacerbation of chronic lower back pain                     Celina Valle MD  06/21/22 2917

## 2022-08-09 ENCOUNTER — OFFICE VISIT (OUTPATIENT)
Dept: PAIN MANAGEMENT | Age: 57
End: 2022-08-09
Payer: MEDICARE

## 2022-08-09 VITALS
SYSTOLIC BLOOD PRESSURE: 166 MMHG | WEIGHT: 247 LBS | DIASTOLIC BLOOD PRESSURE: 110 MMHG | BODY MASS INDEX: 34.58 KG/M2 | OXYGEN SATURATION: 98 % | HEIGHT: 71 IN | HEART RATE: 78 BPM

## 2022-08-09 DIAGNOSIS — M96.1 FAILED BACK SURGICAL SYNDROME: ICD-10-CM

## 2022-08-09 DIAGNOSIS — M79.7 FIBROMYALGIA: ICD-10-CM

## 2022-08-09 DIAGNOSIS — F19.10 SUBSTANCE ABUSE (HCC): ICD-10-CM

## 2022-08-09 DIAGNOSIS — R53.83 FATIGUE, UNSPECIFIED TYPE: Primary | ICD-10-CM

## 2022-08-09 DIAGNOSIS — M96.1 FAILED NECK SYNDROME: ICD-10-CM

## 2022-08-09 DIAGNOSIS — G89.4 CHRONIC PAIN SYNDROME: ICD-10-CM

## 2022-08-09 DIAGNOSIS — F51.01 PRIMARY INSOMNIA: ICD-10-CM

## 2022-08-09 PROCEDURE — G8427 DOCREV CUR MEDS BY ELIG CLIN: HCPCS | Performed by: INTERNAL MEDICINE

## 2022-08-09 PROCEDURE — 3017F COLORECTAL CA SCREEN DOC REV: CPT | Performed by: INTERNAL MEDICINE

## 2022-08-09 PROCEDURE — 99204 OFFICE O/P NEW MOD 45 MIN: CPT | Performed by: INTERNAL MEDICINE

## 2022-08-09 PROCEDURE — G8417 CALC BMI ABV UP PARAM F/U: HCPCS | Performed by: INTERNAL MEDICINE

## 2022-08-09 PROCEDURE — 4004F PT TOBACCO SCREEN RCVD TLK: CPT | Performed by: INTERNAL MEDICINE

## 2022-08-09 RX ORDER — NORTRIPTYLINE HYDROCHLORIDE 25 MG/1
25-50 CAPSULE ORAL NIGHTLY
Qty: 60 CAPSULE | Refills: 0 | Status: SHIPPED | OUTPATIENT
Start: 2022-08-09 | End: 2022-09-06 | Stop reason: SDUPTHER

## 2022-08-09 RX ORDER — PREGABALIN 75 MG/1
75 CAPSULE ORAL 3 TIMES DAILY
Qty: 90 CAPSULE | Refills: 0 | Status: SHIPPED | OUTPATIENT
Start: 2022-08-09 | End: 2022-09-06 | Stop reason: SDUPTHER

## 2022-08-09 RX ORDER — CELECOXIB 200 MG/1
200 CAPSULE ORAL DAILY
Qty: 30 CAPSULE | Refills: 0 | Status: SHIPPED | OUTPATIENT
Start: 2022-08-09 | End: 2022-09-06 | Stop reason: SDUPTHER

## 2022-09-01 NOTE — PROGRESS NOTES
Mr. Roderick He is a 61 y.o. male who seen consultation at the request of Dr. Lashae mueller for pain management. Patient states that he has been having pain in the lower back and going into the left leg has had 1 back surgery occasions June 2021 states had a fall on in the store and slipped and fell and reinjured states he had neck fusion about 4 years ago which helped some back hurts more than the neck states he feels the back did somewhat better after the surgery denies any history of diabetes has had 1 back surgery and has had neck fusion about 4 years ago has had epidural steroid injections to in the neck and 4-5 in the back did not help much is done physical therapy. Describes the pain as a burning throbbing pain sharp pain no pins and needle. Sleep is been poor does complain headaches complains of morning stiffness complains of fatigue. Is on SSI last worked about 2 years ago [employer] states he is  lives on his own. Symptoms started suddenly has a prior episodes which were treated medications different other modalities home exercises physical therapy exercises chiropractic manipulation. Pain starts in the low back buttock and thigh worse on the left side. Activities such as standing walking lifting bending getting in and out of chair doing other ADLs causes him to have increased pain self with medications pain is constant does wax and wane does wake him up at night denies logical bowel or bladder grades of pain 7-8/10 states was given Neurontin 300 mg on Tuesday was not been taking it oxycodone takes 10 mg 4 times a day. The spine surgeon gives a history of high blood pressure and depression patient smokes about half a pack a day denies patient denies using marijuana but on checking old records he had a urine drug screen which is positive in June 2022 drink socially states he is unemployed at this time.   Does complain numbness and tingling leg and foot no weakness no instability gait problems no history of falls ongoing pain symptoms have restricted social and recreational life. The patient's social history, past medical history, family history, medications, allergies and review of systems have all been reviewed and verified from  the patient questionnaire form which has been filled by the patient. The  allergies, medication list  and past medical and surgical history and other information recorded by the MA was again reviewed today  These forms have been scanned into the \"media\" tab of patients electronic medical record. PHYSICAL EXAM:  Please see the physical exam form for a detailed examination on this visit. This form has been completed and scanned into the  Media section of the chart  This is a middle-aged male obese well-built no apparent acute distress alert oriented x3 mood and affect is normal gait has a limp gross motor coordination is normal unable to heel walk or toe walk positive Sandra signs. Back and trunk exam shows scar from surgery tenderness paralumbar region range of motion is restricted with pain extension limited to 0 degrees flexion is -20 degrees sensory exam shows decreased sensation left lower extremity no dermatomal distribution reflexes are absent knees and ankle straight leg raising less than 30 degrees femoral stretch cause ipsilateral pain Kristofer's test is positive. C-spine exam shows scar from surgery tenderness paraspinal muscles range of motion restricted to about 75% of normal sensory exams grossly unremarkable reflexes +1 negative Francisco sign. BP (!) 166/110   Pulse 78   Ht 5' 11\" (1.803 m)   Wt 247 lb (112 kg)   SpO2 98%   BMI 34.45 kg/m²   Skin: Warm and dry. Good turgor. No rashes. No lesions or marks noted  Eyes:  PERRLA, cornea/ conjunctiva normal, no nystagmus  HENT:  Atraumatic, external ears normal, nose normal, oropharynx moist, no pharyngeal exudates. Neck- normal range of motion, no tenderness, supple.  No bruit, no JVD, No lymph nodes appreciated. Thyroid is not enlarged  Respiratory: Lungs CTAP, No respiratory distress, normal breath sounds, no rales, no wheezing   Cardiovascular:  Normal S1,S2, Normal rate, normal rhythm, no murmurs, no gallops, no rubs   GI:  Soft, nondistended, normal bowel sounds, nontender, no organomegaly, no mass, no rebound, no guarding, obese  :  No costovertebral angle tenderness   Musculoskeletal:  No clubbing, no edema, no tenderness, no deformities. There are no varicosities  Lymphatic:  No lymphadenopathy noted   Neurologic:  Alert & oriented x 3, CN 2-12 normal, normal motor function, normal sensory function, no focal deficits noted   Psychiatric:  Speech and behavior appropriate, judgement and thought content normal.  There are tender spots of fibromyalgia on physical exam testing    Patient's old records reviewed in detail records from primary care physician reviewed imaging studies reviewed MRI of the lumbar spine was reviewed which shows multilevel degenerative disc disease facet arthritis with stenosis of the L3-4 L4-5 levels disc protrusion L2-3 causes subarticular stenosis on the right side impingement of the right L3 nerve root there is also stenosis right L3 and left L4 foramen and facet arthritis multiple levels anterior listhesis L4-5. No MR no recent MRI of the C-spine was available for review. IMPRESSION    CHRONIC PAIN SYNDROME  FAILED BACK SURGERY SYNDROME  FAILED NECK SURGERY SYNDROME  FIBROMYALGIA  INSOMNIA  SUBSTANCE ABUSE    Chronic opiate treatment protocol was discussed with the patient. Informed verbal consent was obtained. Treatment guidelines were established. Risks and benefits of the medications including narcotics were discussed with the patient. SOAPP questionnaire and opioid risk tool were assessed.   Long-term and short-term goals of pain management were also addressed including pain relief about 30% from baseline, improving mood, sleep, psychosocial, and physical functioning were addressed. Patient scoring high on the ORT about an 8 did not disclose about his marijuana we will start Celebrex 200 mg 1 a day Lyrica 75 titrate up to 225 mg a day nortriptyline 25 mg 1-2 at bedtime we will not start any opioids to get a clean urine drug screen consider repeat MRI lumbar spine we will try getting imaging studies of the C-spine will do urinedrug screen with GCMS opiates and follow-up on the results Patient profile on OAS website was reviewed will do CBC CMP a TSH and follow-up on the results all treatment options were discussed with patient. Goals of current treatment regimen include improvement in pain, restoration of functioning- with focus on improvement in physical performance, general activity, work or disability,emotional distress, health care utilization and  decreased medication consumption. Will continue to monitor progress towards achieving/maintaining therapeutic goals with special emphasis on  1. Improvement in perceived interfernce  of pain with ADL's. Ability to do home exercises independently. Ability to do household chores indoor and/or outdoor work and social and leisure activities. To increase flexibility/ROM, strength and endurance. Improve psychosocial and physical functioning. 2. Improving sleep to 6-7 hours a night. Improve mood/ anxiety and depression symptoms such as crying spells, low energy, problems with concentration, motivation. 3. Reduction of reliance on opioid analgesia/more appropriate opioid use. Risks and benefits of the medications and other alternative treatments have been/were  discussed with the patient. Any questions on the  common side effects of these medications were also answered. Informed verbal consent was obtained. The current treatment regimen is needed to decrease the patient's pain  symptoms, improve the quality of life and ability to function and improve the  sleep and mood symptoms.    Patient was advised against drinking alcohol with the narcotic pain medicines, advised against driving or handling machinery when  starting or adjusting the dose of medicines, feeling groggy or drowsy, or if having any cognitive issues related to the current medications. Patient is fully aware of the risk of overdose and death, if medicines are misused and not taken as prescribed. If Patient develops new symptoms or if the symptoms worsen,  was told to call the office. Thank you for allowing me to participate in the care of this patient. Michelle MarchDodge MD Reg Avila disclaimer: This note was dictated utilizing voice recognition software. Minor errors in transcription may be present. CC:  No primary care provider on file.

## 2022-09-06 ENCOUNTER — OFFICE VISIT (OUTPATIENT)
Dept: PAIN MANAGEMENT | Age: 57
End: 2022-09-06
Payer: MEDICARE

## 2022-09-06 VITALS
HEART RATE: 69 BPM | DIASTOLIC BLOOD PRESSURE: 105 MMHG | SYSTOLIC BLOOD PRESSURE: 159 MMHG | BODY MASS INDEX: 35.06 KG/M2 | OXYGEN SATURATION: 98 % | WEIGHT: 251.4 LBS

## 2022-09-06 DIAGNOSIS — M79.7 FIBROMYALGIA: ICD-10-CM

## 2022-09-06 DIAGNOSIS — M96.1 FAILED NECK SYNDROME: ICD-10-CM

## 2022-09-06 DIAGNOSIS — F51.01 PRIMARY INSOMNIA: ICD-10-CM

## 2022-09-06 DIAGNOSIS — M96.1 FAILED BACK SURGICAL SYNDROME: ICD-10-CM

## 2022-09-06 DIAGNOSIS — G89.4 CHRONIC PAIN SYNDROME: ICD-10-CM

## 2022-09-06 PROCEDURE — 99214 OFFICE O/P EST MOD 30 MIN: CPT | Performed by: INTERNAL MEDICINE

## 2022-09-06 PROCEDURE — 3017F COLORECTAL CA SCREEN DOC REV: CPT | Performed by: INTERNAL MEDICINE

## 2022-09-06 PROCEDURE — G8427 DOCREV CUR MEDS BY ELIG CLIN: HCPCS | Performed by: INTERNAL MEDICINE

## 2022-09-06 PROCEDURE — G8417 CALC BMI ABV UP PARAM F/U: HCPCS | Performed by: INTERNAL MEDICINE

## 2022-09-06 PROCEDURE — 4004F PT TOBACCO SCREEN RCVD TLK: CPT | Performed by: INTERNAL MEDICINE

## 2022-09-06 RX ORDER — PREGABALIN 75 MG/1
CAPSULE ORAL
Qty: 90 CAPSULE | Refills: 0 | Status: SHIPPED | OUTPATIENT
Start: 2022-09-06 | End: 2022-10-05 | Stop reason: SDUPTHER

## 2022-09-06 RX ORDER — NORTRIPTYLINE HYDROCHLORIDE 25 MG/1
25-50 CAPSULE ORAL NIGHTLY
Qty: 60 CAPSULE | Refills: 0 | Status: SHIPPED | OUTPATIENT
Start: 2022-09-06 | End: 2022-10-05 | Stop reason: SDUPTHER

## 2022-09-06 RX ORDER — CELECOXIB 200 MG/1
200 CAPSULE ORAL DAILY
Qty: 30 CAPSULE | Refills: 0 | Status: SHIPPED | OUTPATIENT
Start: 2022-09-06 | End: 2022-10-05 | Stop reason: SDUPTHER

## 2022-09-06 NOTE — PROGRESS NOTES
Fely Nick  1965  [id number]    HISTORY OF PRESENT ILLNESS:  Mr. Michele Kaplan is a 61 y.o. male returns for a follow up visit for multiple medical problems. His  presenting problems are   1. Chronic pain syndrome    2. Failed back surgical syndrome    3. Failed neck syndrome    4. Fibromyalgia    5. Primary insomnia    . As per information/history obtained from the PADT(patient assessment and documentation tool) -  He complains of pain in the neck, upper back, mid back, and lower back with radiation to the shoulders Bilateral, buttocks, hips Bilateral, and upper leg Bilateral He rates the pain 8/10 and describes it as sharp, aching, burning, numbness, pins and needles. Pain is made worse by: movement, walking, standing, sitting, bending, lifting. Current treatment regimen has helped relieve about 50% of the pain. He denies side effects from the current pain regimen. Patient reports that since the last follow up visit the physical functioning is unchanged, family/social relationships are unchanged, mood is unchanged and sleep patterns are unchanged, and that the overall functioning is unchanged. Patient denies neurological bowel or bladder. Patient denies misusing/abusing his narcotic pain medications or using any illegal drugs. There are No indicators for possible drug abuse, addiction or diversion problems. Upon obtaining the medical history from Mr. Michele Kaplan regarding today's office visit for his presenting problems, patient complains he is having pain in the back and its goes into the left leg. He states he is using Celebrex along with Pamelor. He says he could not get the Lyrica, has lost rx. He mentions his car was [employer]. He reports he is using Oxycodone still 2-3 per day that was given by surgeon. He states he his labs are not done yet. Patient states his sleep is fair. Has fairly normal sleep latency. Averages about 4-6 hours of sleep a night. Denies any signs of sleep apnea.  Feels somewhat rested in the morning. Patient's  subjective report of his mood is fair. he describes occasional symptoms of depression, occasional  irritability and some mood swings. Describes his mood as being neutral and reports some pleasure in his daily activities. Reports  fair  appetite, energy and concentration. Able to function well in different aspects of his daily activities. Denies suicidal or homicidal ideation. Denies any complaints of increased tension, does   Worry sometimes and occasional  irritability  he denies any c/o increased anxiety, No c/o panic attacks or symptoms of PTSD. He mentions his breathing has been okay. ALLERGIES/PAST MED/FAM/SOC HISTORY: Mr. Raysa Abraham allergies, past medical, family and social history were reviewed in the chart. Mr. Raysa Abraham current medications are   Outpatient Medications Prior to Visit   Medication Sig Dispense Refill    celecoxib (CELEBREX) 200 MG capsule Take 1 capsule by mouth in the morning. 30 capsule 0    pregabalin (LYRICA) 75 MG capsule Take 1 capsule by mouth in the morning and 1 capsule at noon and 1 capsule before bedtime. Do all this for 30 days. 90 capsule 0    nortriptyline (PAMELOR) 25 MG capsule Take 1-2 capsules by mouth nightly 60 capsule 0    oxyCODONE-acetaminophen (PERCOCET)  MG per tablet Take 1 tablet by mouth every 6 hours as needed for Pain 12 tablet 0    LISINOPRIL PO Take by mouth      UNKNOWN TO PATIENT       Gabapentin, Once-Daily, 600 MG TABS Take 600 mg by mouth 3 times daily for 30 days. . 90 tablet 0     No facility-administered medications prior to visit. REVIEW OF SYSTEMS: .   Respiratory: Negative for shortness of breath. Cardiovascular: Negative for chest pain, palpitations  Gastrointestinal: Negative for blood in stool, abdominal distention, nausea, vomiting, abdominal pain, diarrhea,constipation.   Neurological: Negative for speech difficulty, weakness and light-headedness, dizziness, tremors, sleepiness  Psychiatric/Behavioral: Negative for suicidal ideas, hallucinations, behavioral problems, self-injury, decreased concentration/cognition, agitation, confusion. PHYSICAL EXAM:   Nursing note and vitals reviewed. BP (!) 159/105   Pulse 69   Wt 251 lb 6.4 oz (114 kg)   SpO2 98%   BMI 35.06 kg/m²   General Appearance: Patient is well nourished, well developed, well groomed and in no acute distress. Skin: Skin is warm and dry, good turgor . No rash or lesions noted. He is not diaphoretic. Pulmonary/Chest: Effort normal. No respiratory distress or use of accessory muscles. Auscultation revealing decreased air exchange bilaterally. He does not have wheezes in the lung fields. He has no rales. Cardiovascular: Normal rate, regular rhythm, normal heart sounds, and does not have murmur. Exam reveals no gallop and no friction rub. Musculoskeletal / Extremities: Range of motion is normal. Gait is normal, assistive devices use: none. He exhibits edema: none, and no tenderness. Neurological/Psychiatric:He is alert and oriented to person, place, and time. Coordination is  normal.   Judgement and Insight is normal  His mood is Appropriate and affect is Neutral/Euthymic(normal) . His behavior is normal.   thought content normal.        IMPRESSION:     1. Chronic pain syndrome    2. Failed back surgical syndrome    3. Failed neck syndrome    4. Fibromyalgia    5. Primary insomnia        PLAN:  Informed verbal consent was obtained. -[place] record was obtained and reviewed  for the last one year and no indicators of drug misuse  were found. Any other controlled substance prescriptions  seen on the record have been accounted for, I am aware of the patient receiving these medications. Yenifer Littlejohn [place] record will be rechecked as part of office protocol.    -Patient's urine drug screen results with GC/MS confirmation were obtained and reviewed and were negative for any illicit drugs.  Prescribed medications were within acceptable range.   -Restart Lyrica -He was advised to increase fluids ( 5-7  glasses of fluid daily), limit caffeine, avoid cheese products, increase dietary fiber, increase activity and exercise as tolerated and relax regularly and enjoy meals   -Interim history reviewed   -Continue with Celebrex 200 mg daily   -he was advised proper sleep hygiene-told to avoid:use of caffeine or other stimulants after noon, alcohol use near bedtime, long or frequent naps during the day, erratic sleep schedule, heavy meals near bedtime, vigorous exercise near bedtime and use of electronic devices near bedtime   -Continue with Pamelor   -Restart Lyrica 75 mg increase to 225 mg   -Start tens unit   -Continue with Percocet decrease to 7.5 mg 4 per day   -MRI of lumbar spine reviewed, shows DDD/HNP with stenosis, hardware intact   Mr. Luzmaria Puga will be prescribed  the medications  listed below which are for treatment of his presenting  medical problems which for this visit include:   Diagnoses of Chronic pain syndrome, Failed back surgical syndrome, Failed neck syndrome, Fibromyalgia, and Primary insomnia were pertinent to this visit. Medications/orders associated with this visit:    Current Outpatient Medications   Medication Sig Dispense Refill    celecoxib (CELEBREX) 200 MG capsule Take 1 capsule by mouth in the morning. 30 capsule 0    pregabalin (LYRICA) 75 MG capsule Take 1 capsule by mouth in the morning and 1 capsule at noon and 1 capsule before bedtime. Do all this for 30 days. 90 capsule 0    nortriptyline (PAMELOR) 25 MG capsule Take 1-2 capsules by mouth nightly 60 capsule 0    oxyCODONE-acetaminophen (PERCOCET)  MG per tablet Take 1 tablet by mouth every 6 hours as needed for Pain 12 tablet 0    LISINOPRIL PO Take by mouth      UNKNOWN TO PATIENT       Gabapentin, Once-Daily, 600 MG TABS Take 600 mg by mouth 3 times daily for 30 days. . 90 tablet 0     No current facility-administered medications for this visit.         Goals of current treatment regimen include improvement in pain, restoration of functioning- with focus on improvement in physical performance, general activity, work or disability,emotional distress, health care utilization and  decreased medication consumption. Will continue to monitor progress towards achieving/maintaining therapeutic goals with special emphasis on  1. Improvement in perceived interfernce  of pain with ADL's. Ability to do home exercises independently. Ability to do household chores indoor and/or outdoor work and social and leisure activities. To increase flexibility/ROM, strength and endurance. Improve psychosocial and physical functioning.- he is not showing any significant progress/or showing regression  towards this goal and reassessment and adjustment of goals/treatment have been made. 2. Improving sleep to 6-7 hours a night. Improve mood/ anxiety and depression symptoms such as crying spells, low energy, problems with concentration, motivation.- he is showing progression towards this treatment goal with the current regimen. 3. Reduction of reliance on opioid analgesia/more appropriate opioid use. - he is showing progression towards this treatment goal with the current regimen. Risks and benefits of the medications and other alternative treatments have been/were  discussed with the patient. Any questions on the  common side effects of these medications were also answered. He was advised against drinking alcohol with the narcotic pain medicines, advised against driving or handling machinery when  starting or adjusting the dose of medicines, feeling groggy or drowsy, or if having any cognitive issues related to the current medications. Heis fully aware of the risk of overdose and death, if medicines are misused and not taken as prescribed. If he develops new symptoms or if the symptoms worsen, he was told to call the office. .  Thank you for allowing me to participate in the care of this patient.     Matheus Noble, MD    Cc: No primary care provider on file.

## 2022-09-09 ENCOUNTER — TELEPHONE (OUTPATIENT)
Dept: PAIN MANAGEMENT | Age: 57
End: 2022-09-09

## 2022-09-09 DIAGNOSIS — M96.1 FAILED NECK SYNDROME: ICD-10-CM

## 2022-09-09 DIAGNOSIS — M96.1 FAILED BACK SURGICAL SYNDROME: ICD-10-CM

## 2022-09-09 DIAGNOSIS — G89.4 CHRONIC PAIN SYNDROME: Primary | ICD-10-CM

## 2022-09-09 RX ORDER — OXYCODONE AND ACETAMINOPHEN 7.5; 325 MG/1; MG/1
1 TABLET ORAL EVERY 6 HOURS PRN
Qty: 112 TABLET | Refills: 0 | Status: SHIPPED | OUTPATIENT
Start: 2022-09-09 | End: 2022-10-05 | Stop reason: SDUPTHER

## 2022-09-09 NOTE — TELEPHONE ENCOUNTER
Patient called saying his is in extreme pain. He was seen three days ago on 9/6 and in the office notes it says to Decrease percocet to 7.5mg 4x a day, but when I checked the med list, his percocet was never sent over.       Please advise     Patient requested a call once sent     [facility] [address], [address] [facility] [address] [facility] [address] [place] [address]   Phone:  [phone]  Fax:  [phone]

## 2022-09-23 ENCOUNTER — HOSPITAL ENCOUNTER (EMERGENCY)
Age: 57
Discharge: HOME | End: 2022-09-24
Attending: EMERGENCY MEDICINE
Payer: MEDICARE

## 2022-09-23 VITALS
DIASTOLIC BLOOD PRESSURE: 96 MMHG | SYSTOLIC BLOOD PRESSURE: 176 MMHG | BODY MASS INDEX: 34.76 KG/M2 | TEMPERATURE: 98.5 F | HEART RATE: 84 BPM | HEIGHT: 71 IN | WEIGHT: 248.3 LBS | RESPIRATION RATE: 14 BRPM | OXYGEN SATURATION: 100 %

## 2022-09-23 DIAGNOSIS — M54.50 ACUTE EXACERBATION OF CHRONIC LOW BACK PAIN: ICD-10-CM

## 2022-09-23 DIAGNOSIS — R51.9 ACUTE NONINTRACTABLE HEADACHE, UNSPECIFIED HEADACHE TYPE: Primary | ICD-10-CM

## 2022-09-23 DIAGNOSIS — I65.22 ASYMPTOMATIC STENOSIS OF LEFT CAROTID ARTERY: ICD-10-CM

## 2022-09-23 DIAGNOSIS — G89.29 ACUTE EXACERBATION OF CHRONIC LOW BACK PAIN: ICD-10-CM

## 2022-09-23 LAB
BASOPHILS ABSOLUTE: 0 K/UL (ref 0–0.2)
BASOPHILS RELATIVE PERCENT: 0.4 %
EOSINOPHILS ABSOLUTE: 0.3 K/UL (ref 0–0.6)
EOSINOPHILS RELATIVE PERCENT: 3.2 %
HCT VFR BLD CALC: 44.2 % (ref 40.5–52.5)
HEMOGLOBIN: 14.5 G/DL (ref 13.5–17.5)
LYMPHOCYTES ABSOLUTE: 2.5 K/UL (ref 1–5.1)
LYMPHOCYTES RELATIVE PERCENT: 28.5 %
MCH RBC QN AUTO: 28.9 PG (ref 26–34)
MCHC RBC AUTO-ENTMCNC: 32.7 G/DL (ref 31–36)
MCV RBC AUTO: 88.5 FL (ref 80–100)
MONOCYTES ABSOLUTE: 0.7 K/UL (ref 0–1.3)
MONOCYTES RELATIVE PERCENT: 7.8 %
NEUTROPHILS ABSOLUTE: 5.3 K/UL (ref 1.7–7.7)
NEUTROPHILS RELATIVE PERCENT: 60.1 %
PDW BLD-RTO: 13 % (ref 12.4–15.4)
PLATELET # BLD: 200 K/UL (ref 135–450)
PMV BLD AUTO: 8.5 FL (ref 5–10.5)
RBC # BLD: 4.99 M/UL (ref 4.2–5.9)
WBC # BLD: 8.8 K/UL (ref 4–11)

## 2022-09-23 PROCEDURE — 85025 COMPLETE CBC W/AUTO DIFF WBC: CPT

## 2022-09-23 PROCEDURE — 80053 COMPREHEN METABOLIC PANEL: CPT

## 2022-09-23 PROCEDURE — 96375 TX/PRO/DX INJ NEW DRUG ADDON: CPT

## 2022-09-23 PROCEDURE — 6360000002 HC RX W HCPCS: Performed by: EMERGENCY MEDICINE

## 2022-09-23 PROCEDURE — 6370000000 HC RX 637 (ALT 250 FOR IP): Performed by: EMERGENCY MEDICINE

## 2022-09-23 PROCEDURE — 99285 EMERGENCY DEPT VISIT HI MDM: CPT

## 2022-09-23 PROCEDURE — 96374 THER/PROPH/DIAG INJ IV PUSH: CPT

## 2022-09-23 PROCEDURE — 96361 HYDRATE IV INFUSION ADD-ON: CPT

## 2022-09-23 PROCEDURE — 2580000003 HC RX 258: Performed by: EMERGENCY MEDICINE

## 2022-09-23 RX ORDER — PROCHLORPERAZINE EDISYLATE 5 MG/ML
10 INJECTION INTRAMUSCULAR; INTRAVENOUS ONCE
Status: COMPLETED | OUTPATIENT
Start: 2022-09-23 | End: 2022-09-23

## 2022-09-23 RX ORDER — 0.9 % SODIUM CHLORIDE 0.9 %
1000 INTRAVENOUS SOLUTION INTRAVENOUS ONCE
Status: COMPLETED | OUTPATIENT
Start: 2022-09-23 | End: 2022-09-24

## 2022-09-23 RX ORDER — DIPHENHYDRAMINE HYDROCHLORIDE 50 MG/ML
25 INJECTION INTRAMUSCULAR; INTRAVENOUS ONCE
Status: COMPLETED | OUTPATIENT
Start: 2022-09-23 | End: 2022-09-23

## 2022-09-23 RX ORDER — ORPHENADRINE CITRATE 30 MG/ML
60 INJECTION INTRAMUSCULAR; INTRAVENOUS ONCE
Status: COMPLETED | OUTPATIENT
Start: 2022-09-23 | End: 2022-09-23

## 2022-09-23 RX ORDER — BUTALBITAL, ACETAMINOPHEN AND CAFFEINE 50; 325; 40 MG/1; MG/1; MG/1
2 TABLET ORAL ONCE
Status: COMPLETED | OUTPATIENT
Start: 2022-09-23 | End: 2022-09-23

## 2022-09-23 RX ADMIN — PROCHLORPERAZINE EDISYLATE 10 MG: 5 INJECTION INTRAMUSCULAR; INTRAVENOUS at 23:52

## 2022-09-23 RX ADMIN — BUTALBITAL, ACETAMINOPHEN, AND CAFFEINE 2 TABLET: 50; 325; 40 TABLET ORAL at 23:57

## 2022-09-23 RX ADMIN — SODIUM CHLORIDE 1000 ML: 9 INJECTION, SOLUTION INTRAVENOUS at 23:52

## 2022-09-23 RX ADMIN — ORPHENADRINE CITRATE 60 MG: 30 INJECTION INTRAMUSCULAR; INTRAVENOUS at 23:53

## 2022-09-23 RX ADMIN — DIPHENHYDRAMINE HYDROCHLORIDE 25 MG: 50 INJECTION, SOLUTION INTRAMUSCULAR; INTRAVENOUS at 23:52

## 2022-09-23 ASSESSMENT — PAIN SCALES - GENERAL
PAINLEVEL_OUTOF10: 10
PAINLEVEL_OUTOF10: 10

## 2022-09-23 ASSESSMENT — PAIN DESCRIPTION - LOCATION: LOCATION: HEAD;BACK

## 2022-09-23 ASSESSMENT — PAIN DESCRIPTION - FREQUENCY: FREQUENCY: CONTINUOUS

## 2022-09-23 ASSESSMENT — PAIN DESCRIPTION - DESCRIPTORS: DESCRIPTORS: THROBBING;PRESSURE

## 2022-09-23 ASSESSMENT — PAIN - FUNCTIONAL ASSESSMENT: PAIN_FUNCTIONAL_ASSESSMENT: 0-10

## 2022-09-23 ASSESSMENT — PAIN DESCRIPTION - PAIN TYPE: TYPE: ACUTE PAIN

## 2022-09-24 ENCOUNTER — APPOINTMENT (OUTPATIENT)
Dept: CT IMAGING | Age: 57
End: 2022-09-24
Payer: MEDICARE

## 2022-09-24 LAB
A/G RATIO: 1.6 (ref 1.1–2.2)
ALBUMIN SERPL-MCNC: 4.7 G/DL (ref 3.4–5)
ALP BLD-CCNC: 97 U/L (ref 40–129)
ALT SERPL-CCNC: 18 U/L (ref 10–40)
ANION GAP SERPL CALCULATED.3IONS-SCNC: 9 MMOL/L (ref 3–16)
AST SERPL-CCNC: 20 U/L (ref 15–37)
BILIRUB SERPL-MCNC: 0.4 MG/DL (ref 0–1)
BUN BLDV-MCNC: 19 MG/DL (ref 7–20)
CALCIUM SERPL-MCNC: 9.8 MG/DL (ref 8.3–10.6)
CHLORIDE BLD-SCNC: 101 MMOL/L (ref 99–110)
CO2: 30 MMOL/L (ref 21–32)
CREAT SERPL-MCNC: 0.9 MG/DL (ref 0.9–1.3)
GFR AFRICAN AMERICAN: >60
GFR NON-AFRICAN AMERICAN: >60
GLUCOSE BLD-MCNC: 103 MG/DL (ref 70–99)
POTASSIUM SERPL-SCNC: 4.1 MMOL/L (ref 3.5–5.1)
SODIUM BLD-SCNC: 140 MMOL/L (ref 136–145)
TOTAL PROTEIN: 7.6 G/DL (ref 6.4–8.2)

## 2022-09-24 PROCEDURE — 70450 CT HEAD/BRAIN W/O DYE: CPT

## 2022-09-24 PROCEDURE — 6360000004 HC RX CONTRAST MEDICATION: Performed by: EMERGENCY MEDICINE

## 2022-09-24 PROCEDURE — 6360000002 HC RX W HCPCS: Performed by: EMERGENCY MEDICINE

## 2022-09-24 PROCEDURE — 70498 CT ANGIOGRAPHY NECK: CPT

## 2022-09-24 RX ORDER — BUTALBITAL, ACETAMINOPHEN AND CAFFEINE 50; 325; 40 MG/1; MG/1; MG/1
1 TABLET ORAL EVERY 4 HOURS PRN
Qty: 15 TABLET | Refills: 0 | Status: SHIPPED | OUTPATIENT
Start: 2022-09-24

## 2022-09-24 RX ORDER — ASPIRIN 81 MG/1
81 TABLET ORAL DAILY
Qty: 30 TABLET | Refills: 0 | Status: SHIPPED | OUTPATIENT
Start: 2022-09-24

## 2022-09-24 RX ORDER — MORPHINE SULFATE 4 MG/ML
4 INJECTION, SOLUTION INTRAMUSCULAR; INTRAVENOUS ONCE
Status: COMPLETED | OUTPATIENT
Start: 2022-09-24 | End: 2022-09-24

## 2022-09-24 RX ADMIN — IOPAMIDOL 75 ML: 755 INJECTION, SOLUTION INTRAVENOUS at 00:28

## 2022-09-24 RX ADMIN — MORPHINE SULFATE 4 MG: 4 INJECTION, SOLUTION INTRAMUSCULAR; INTRAVENOUS at 01:00

## 2022-09-24 ASSESSMENT — PAIN SCALES - GENERAL
PAINLEVEL_OUTOF10: 3
PAINLEVEL_OUTOF10: 0
PAINLEVEL_OUTOF10: 0

## 2022-09-24 NOTE — DISCHARGE INSTRUCTIONS
Return for fever, severe persistent headache, persistent vomiting, numbness or weakness, dizziness or fainting, trouble talking

## 2022-09-24 NOTE — ED NOTES
Pt dc/d with instructions and rx's in stable condition, ambulatory to lobby. Home per ride.       Neal Marshall RN  09/24/22 9445
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
## 2022-10-02 ENCOUNTER — HOSPITAL ENCOUNTER (EMERGENCY)
Age: 57
Discharge: HOME | End: 2022-10-02
Attending: EMERGENCY MEDICINE
Payer: MEDICARE

## 2022-10-02 VITALS
RESPIRATION RATE: 18 BRPM | WEIGHT: 252 LBS | HEIGHT: 70 IN | HEART RATE: 86 BPM | SYSTOLIC BLOOD PRESSURE: 157 MMHG | TEMPERATURE: 98.6 F | DIASTOLIC BLOOD PRESSURE: 115 MMHG | BODY MASS INDEX: 36.08 KG/M2 | OXYGEN SATURATION: 99 %

## 2022-10-02 DIAGNOSIS — T23.232A PARTIAL THICKNESS BURN OF MULTIPLE FINGERS OF LEFT HAND EXCLUDING THUMB, INITIAL ENCOUNTER: Primary | ICD-10-CM

## 2022-10-02 PROCEDURE — 99282 EMERGENCY DEPT VISIT SF MDM: CPT

## 2022-10-02 ASSESSMENT — PAIN DESCRIPTION - ONSET: ONSET: ON-GOING

## 2022-10-02 ASSESSMENT — PAIN - FUNCTIONAL ASSESSMENT
PAIN_FUNCTIONAL_ASSESSMENT: 0-10
PAIN_FUNCTIONAL_ASSESSMENT: ACTIVITIES ARE NOT PREVENTED

## 2022-10-02 ASSESSMENT — PAIN DESCRIPTION - FREQUENCY: FREQUENCY: CONTINUOUS

## 2022-10-02 ASSESSMENT — PAIN SCALES - GENERAL: PAINLEVEL_OUTOF10: 9

## 2022-10-02 ASSESSMENT — PAIN DESCRIPTION - ORIENTATION: ORIENTATION: LEFT

## 2022-10-02 ASSESSMENT — PAIN DESCRIPTION - DESCRIPTORS: DESCRIPTORS: BURNING

## 2022-10-02 ASSESSMENT — PAIN DESCRIPTION - PAIN TYPE: TYPE: ACUTE PAIN

## 2022-10-02 ASSESSMENT — PAIN DESCRIPTION - LOCATION: LOCATION: FINGER (COMMENT WHICH ONE)
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
## 2022-10-05 ENCOUNTER — TELEPHONE (OUTPATIENT)
Dept: PAIN MANAGEMENT | Age: 57
End: 2022-10-05

## 2022-10-05 ENCOUNTER — OFFICE VISIT (OUTPATIENT)
Dept: PAIN MANAGEMENT | Age: 57
End: 2022-10-05
Payer: MEDICARE

## 2022-10-05 VITALS
DIASTOLIC BLOOD PRESSURE: 87 MMHG | WEIGHT: 250 LBS | HEART RATE: 70 BPM | BODY MASS INDEX: 35.87 KG/M2 | SYSTOLIC BLOOD PRESSURE: 137 MMHG

## 2022-10-05 DIAGNOSIS — M96.1 FAILED NECK SYNDROME: ICD-10-CM

## 2022-10-05 DIAGNOSIS — G89.4 CHRONIC PAIN SYNDROME: ICD-10-CM

## 2022-10-05 DIAGNOSIS — M96.1 FAILED BACK SURGICAL SYNDROME: ICD-10-CM

## 2022-10-05 DIAGNOSIS — M79.7 FIBROMYALGIA: ICD-10-CM

## 2022-10-05 DIAGNOSIS — F19.10 SUBSTANCE ABUSE (HCC): ICD-10-CM

## 2022-10-05 PROCEDURE — 3017F COLORECTAL CA SCREEN DOC REV: CPT | Performed by: INTERNAL MEDICINE

## 2022-10-05 PROCEDURE — 99213 OFFICE O/P EST LOW 20 MIN: CPT | Performed by: INTERNAL MEDICINE

## 2022-10-05 PROCEDURE — G8427 DOCREV CUR MEDS BY ELIG CLIN: HCPCS | Performed by: INTERNAL MEDICINE

## 2022-10-05 PROCEDURE — G8484 FLU IMMUNIZE NO ADMIN: HCPCS | Performed by: INTERNAL MEDICINE

## 2022-10-05 PROCEDURE — 4004F PT TOBACCO SCREEN RCVD TLK: CPT | Performed by: INTERNAL MEDICINE

## 2022-10-05 PROCEDURE — G8417 CALC BMI ABV UP PARAM F/U: HCPCS | Performed by: INTERNAL MEDICINE

## 2022-10-05 RX ORDER — CELECOXIB 200 MG/1
200 CAPSULE ORAL DAILY
Qty: 30 CAPSULE | Refills: 0 | Status: SHIPPED | OUTPATIENT
Start: 2022-10-05 | End: 2022-11-02 | Stop reason: SDUPTHER

## 2022-10-05 RX ORDER — PREGABALIN 75 MG/1
CAPSULE ORAL
Qty: 90 CAPSULE | Refills: 0 | Status: SHIPPED | OUTPATIENT
Start: 2022-10-05 | End: 2022-11-02 | Stop reason: SDUPTHER

## 2022-10-05 RX ORDER — NORTRIPTYLINE HYDROCHLORIDE 25 MG/1
25-50 CAPSULE ORAL NIGHTLY
Qty: 60 CAPSULE | Refills: 0 | Status: SHIPPED | OUTPATIENT
Start: 2022-10-05 | End: 2022-11-02 | Stop reason: SDUPTHER

## 2022-10-05 RX ORDER — OXYCODONE AND ACETAMINOPHEN 7.5; 325 MG/1; MG/1
1 TABLET ORAL EVERY 6 HOURS PRN
Qty: 112 TABLET | Refills: 0 | Status: SHIPPED | OUTPATIENT
Start: 2022-10-05 | End: 2022-11-02 | Stop reason: SDUPTHER

## 2022-10-05 NOTE — TELEPHONE ENCOUNTER
Scripts have been e-scribed to the pharmacy, and it has been verified that it was received. Patient has been notified.

## 2022-10-06 NOTE — PROGRESS NOTES
Chula Adames  1965  [id number]      HISTORY OF PRESENT ILLNESS:  Mr. Jazz Bowman is a 61 y.o. male returns for a follow up visit for pain management  He has a diagnosis of   1. Encounter for therapeutic drug monitoring    2. Chronic pain syndrome    3. Failed back surgical syndrome    4. Failed neck syndrome    5. Fibromyalgia    6. Primary insomnia    7. Substance abuse ([address]    . He complains of pain in the  upper back, mid back, lower back, left knee,left elbow with radiation to the, left shoulder, left arm, left hand   He rates the pain 8/10 and describes it as numbness, pins and needles. Current treatment regimen has helped relieve about 40% of the pain. He denies any side effects from the current pain regimen. Patient reports that since the last follow up visit the physical functioning is unchanged, family/social relationships are unchanged, mood is unchanged sleep patterns are unchanged, and that the overall functioning is unchanged. Patient denies misusing/abusing his narcotic pain medications or using any illegal drugs. There are No indicators for possible drug abuse, addiction or diversion problems, patient states he has been doing fair. Mr. Jazz Bowman states he is waiting for another injection in the back. He states he is using Percocet along with Celebrex and the other adjuvants. Patient says he is doing his stretching exercises. ALLERGIES: Patients list of allergies were reviewed     MEDICATIONS: Mr. Jazz Bowman list of medications were reviewed. His current medications are   Outpatient Medications Prior to Visit   Medication Sig Dispense Refill    butalbital-acetaminophen-caffeine (FIORICET, ESGIC) -40 MG per tablet Take 1 tablet by mouth every 4 hours as needed for Headaches 15 tablet 0    aspirin EC 81 MG EC tablet Take 1 tablet by mouth daily 30 tablet 0    Tens Unit MISC by Does not apply route Use as directed.  1 each 0    LISINOPRIL PO Take by mouth      UNKNOWN TO PATIENT oxyCODONE-acetaminophen (PERCOCET) 7.5-325 MG per tablet Take 1 tablet by mouth every 6 hours as needed for Pain (Max 4 per day) for up to 28 days. 112 tablet 0    celecoxib (CELEBREX) 200 MG capsule Take 1 capsule by mouth daily 30 capsule 0    pregabalin (LYRICA) 75 MG capsule Take One capsule hs 1 week, 2 capsules hs 1 week, 1 tab capsules 2 tabs capsules 90 capsule 0    nortriptyline (PAMELOR) 25 MG capsule Take 1-2 capsules by mouth nightly 60 capsule 0    Gabapentin, Once-Daily, 600 MG TABS Take 600 mg by mouth 3 times daily for 30 days. . 90 tablet 0     No facility-administered medications prior to visit. REVIEW OF SYSTEMS:    Respiratory: Negative for apnea, chest tightness and shortness of breath or change in baseline breathing. PHYSICAL EXAM:   Nursing note and vitals reviewed. /87   Pulse 70   Wt 250 lb (113.4 kg)   BMI 35.87 kg/m²   Constitutional: He appears well-developed and well-nourished. No acute distress. Cardiovascular: Normal rate, regular rhythm, normal heart sounds, and does not have murmur. Pulmonary/Chest: Effort normal. No respiratory distress. He does not have wheezes in the lung fields. He has no rales. Neurological/Psychiatric:He is alert and oriented to person, place, and time. Coordination is  normal.  His mood isAppropriate and affect is Neutral/Euthymic(normal) . His    IMPRESSION:   1. Chronic pain syndrome    2. Failed back surgical syndrome    3. Failed neck syndrome    4. Fibromyalgia    5. Substance abuse ([facility] [address]        PLAN:  Informed verbal consent was obtained  -Urine drug screen with GC/MS for opiates and drugs of abuse was ordered and will follow up on results.   -Continue with Percocet 7.5 mg 4 per day  -Discussed use, benefit, and side effects of prescribed medications. Barriers to medication compliance addressed. All patient questions answered.  Pt voiced understanding.    -Continue with all other adjuvant medications as before    Current Outpatient Medications   Medication Sig Dispense Refill    oxyCODONE-acetaminophen (PERCOCET) 7.5-325 MG per tablet Take 1 tablet by mouth every 6 hours as needed for Pain (Max 4 per day) for up to 28 days. 112 tablet 0    celecoxib (CELEBREX) 200 MG capsule Take 1 capsule by mouth daily 30 capsule 0    pregabalin (LYRICA) 75 MG capsule Take One capsule hs 1 week, 2 capsules hs 1 week, 1 tab capsules 2 tabs capsules 90 capsule 0    nortriptyline (PAMELOR) 25 MG capsule Take 1-2 capsules by mouth nightly 60 capsule 0    butalbital-acetaminophen-caffeine (FIORICET, ESGIC) -40 MG per tablet Take 1 tablet by mouth every 4 hours as needed for Headaches 15 tablet 0    aspirin EC 81 MG EC tablet Take 1 tablet by mouth daily 30 tablet 0    Tens Unit MISC by Does not apply route Use as directed. 1 each 0    LISINOPRIL PO Take by mouth      UNKNOWN TO PATIENT       Gabapentin, Once-Daily, 600 MG TABS Take 600 mg by mouth 3 times daily for 30 days. . 90 tablet 0     No current facility-administered medications for this visit. I will continue his current medication regimen  which is part of the above treatment schedule. It has been helping with Mr. Jordan Liparley chronic  medical problems which for this visit include: The primary encounter diagnosis was Encounter for therapeutic drug monitoring. Diagnoses of Chronic pain syndrome, Failed back surgical syndrome, Failed neck syndrome, Fibromyalgia, Primary insomnia, and Substance abuse ([facility] [address] were also pertinent to this visit. Risks and benefits of the medications and other alternative treatments  including no treatment were discussed with the patient. The common side effects of these medications were also explained to the patient. Informed verbal consent was obtained.    Goals of current treatment regimen include improvement in pain, restoration of functioning- with focus on improvement in physical performance, general activity, work or disability,emotional distress, health care utilization and  decreased medication consumption. Will continue to monitor progress towards achieving/maintaining therapeutic goals with special emphasis on  1. Improvement in perceived interfernce  of pain with ADL's. Ability to do home exercises independently. Ability to do household chores indoor and/or outdoor work and social and leisure activities. Improve psychosocial and physical functioning. - he is showing progression towards this treatment goal with the current regimen. He was advised against drinking alcohol with the narcotic pain medicines, advised against driving or handling machinery while adjusting the dose of medicines or if having cognitive  issues related to the current medications. Risk of overdose and death, if medicines not taken as prescribed, were also discussed. If the patient develops new symptoms or if the symptoms worsen, the patient should call the office. While transcribing every attempt was made to maintain the accuracy of the note in terms of it's contents,there may have been some errors made inadvertently. Thank you for allowing me to participate in the care of this patient.     Walt Ferrell MD.    Cc: Dr.[facility]

## 2022-11-02 ENCOUNTER — OFFICE VISIT (OUTPATIENT)
Dept: PAIN MANAGEMENT | Age: 57
End: 2022-11-02
Payer: MEDICARE

## 2022-11-02 VITALS
WEIGHT: 257 LBS | HEART RATE: 72 BPM | BODY MASS INDEX: 36.88 KG/M2 | DIASTOLIC BLOOD PRESSURE: 68 MMHG | SYSTOLIC BLOOD PRESSURE: 134 MMHG

## 2022-11-02 DIAGNOSIS — F19.10 SUBSTANCE ABUSE (HCC): ICD-10-CM

## 2022-11-02 DIAGNOSIS — M96.1 FAILED NECK SYNDROME: ICD-10-CM

## 2022-11-02 DIAGNOSIS — M79.7 FIBROMYALGIA: ICD-10-CM

## 2022-11-02 DIAGNOSIS — M96.1 FAILED BACK SURGICAL SYNDROME: ICD-10-CM

## 2022-11-02 DIAGNOSIS — F11.10 NARCOTIC ABUSE (HCC): ICD-10-CM

## 2022-11-02 DIAGNOSIS — F51.01 PRIMARY INSOMNIA: ICD-10-CM

## 2022-11-02 DIAGNOSIS — G89.4 CHRONIC PAIN SYNDROME: ICD-10-CM

## 2022-11-02 PROCEDURE — 3017F COLORECTAL CA SCREEN DOC REV: CPT | Performed by: INTERNAL MEDICINE

## 2022-11-02 PROCEDURE — G8417 CALC BMI ABV UP PARAM F/U: HCPCS | Performed by: INTERNAL MEDICINE

## 2022-11-02 PROCEDURE — 99214 OFFICE O/P EST MOD 30 MIN: CPT | Performed by: INTERNAL MEDICINE

## 2022-11-02 PROCEDURE — G8484 FLU IMMUNIZE NO ADMIN: HCPCS | Performed by: INTERNAL MEDICINE

## 2022-11-02 PROCEDURE — G8427 DOCREV CUR MEDS BY ELIG CLIN: HCPCS | Performed by: INTERNAL MEDICINE

## 2022-11-02 PROCEDURE — 4004F PT TOBACCO SCREEN RCVD TLK: CPT | Performed by: INTERNAL MEDICINE

## 2022-11-02 RX ORDER — OXYCODONE AND ACETAMINOPHEN 7.5; 325 MG/1; MG/1
1 TABLET ORAL EVERY 6 HOURS PRN
Qty: 112 TABLET | Refills: 0 | Status: SHIPPED | OUTPATIENT
Start: 2022-11-02 | End: 2022-11-02 | Stop reason: CLARIF

## 2022-11-02 RX ORDER — PREGABALIN 75 MG/1
CAPSULE ORAL
Qty: 90 CAPSULE | Refills: 1 | Status: SHIPPED | OUTPATIENT
Start: 2022-11-02 | End: 2022-12-02

## 2022-11-02 RX ORDER — CELECOXIB 200 MG/1
200 CAPSULE ORAL DAILY
Qty: 30 CAPSULE | Refills: 0 | Status: SHIPPED | OUTPATIENT
Start: 2022-11-02

## 2022-11-02 RX ORDER — PREGABALIN 75 MG/1
CAPSULE ORAL
Qty: 90 CAPSULE | Refills: 0 | Status: SHIPPED | OUTPATIENT
Start: 2022-11-02 | End: 2022-11-02 | Stop reason: CLARIF

## 2022-11-02 RX ORDER — NORTRIPTYLINE HYDROCHLORIDE 25 MG/1
25-50 CAPSULE ORAL NIGHTLY
Qty: 60 CAPSULE | Refills: 0 | Status: SHIPPED | OUTPATIENT
Start: 2022-11-02

## 2022-11-02 RX ORDER — OXYCODONE AND ACETAMINOPHEN 7.5; 325 MG/1; MG/1
1 TABLET ORAL EVERY 6 HOURS PRN
Qty: 112 TABLET | Refills: 0 | Status: SHIPPED | OUTPATIENT
Start: 2022-11-02 | End: 2022-11-30

## 2022-11-02 NOTE — PROGRESS NOTES
Ibrahima Hector  1965  [id number]    HISTORY OF PRESENT ILLNESS:  Mr. Torrey Perez is a 61 y.o. male returns for a follow up visit for multiple medical problems. His  presenting problems are   1. Failed back surgical syndrome    2. Chronic pain syndrome    3. Failed neck syndrome    4. Fibromyalgia    5. Substance abuse ([facility]    6. Primary insomnia    7. Narcotic abuse ([facility]    . As per information/history obtained from the PADT(patient assessment and documentation tool) -  He complains of pain in the elbows Left, upper back, mid back, lower back, and knees Left with radiation to the shoulders Left, arms Left, and hands Left He rates the pain 8/10 and describes it as numbness, pins and needles. Pain is made worse by: movement, walking, standing. Current treatment regimen has helped relieve about 20% of the pain. He denies side effects from the current pain regimen. Patient reports that since the last follow up visit the physical functioning is worse, family/social relationships are unchanged, mood is unchanged and sleep patterns are unchanged, and that the overall functioning is worse. Patient denies neurological bowel or bladder. Patient denies misusing/abusing his narcotic pain medications or using any illegal drugs. There are some indicators for possible drug abuse, addiction or diversion problems. Upon obtaining the medical history from Mr. Torrey Perez regarding today's office visit for his presenting problems, patient reports he has been doing better, had MBB dose, which did help with the pain. Mention he is managing and going for RFA. He says he is using Percocet along with Celebrex  and the other adjuvants . He denies any side effects    ALLERGIES/PAST MED/FAM/SOC HISTORY: Mr. Torrey Perez allergies, past medical, family and social history were reviewed in the chart.     Mr. Torrey Perez current medications are   Outpatient Medications Prior to Visit   Medication Sig Dispense Refill    oxyCODONE-acetaminophen (PERCOCET) 7.5-325 MG per tablet Take 1 tablet by mouth every 6 hours as needed for Pain (Max 4 per day) for up to 28 days. 112 tablet 0    celecoxib (CELEBREX) 200 MG capsule Take 1 capsule by mouth daily 30 capsule 0    pregabalin (LYRICA) 75 MG capsule Take One capsule hs 1 week, 2 capsules hs 1 week, 1 tab capsules 2 tabs capsules 90 capsule 0    nortriptyline (PAMELOR) 25 MG capsule Take 1-2 capsules by mouth nightly 60 capsule 0    butalbital-acetaminophen-caffeine (FIORICET, ESGIC) -40 MG per tablet Take 1 tablet by mouth every 4 hours as needed for Headaches 15 tablet 0    aspirin EC 81 MG EC tablet Take 1 tablet by mouth daily 30 tablet 0    Tens Unit MISC by Does not apply route Use as directed. 1 each 0    LISINOPRIL PO Take by mouth      UNKNOWN TO PATIENT       Gabapentin, Once-Daily, 600 MG TABS Take 600 mg by mouth 3 times daily for 30 days. . 90 tablet 0     No facility-administered medications prior to visit. REVIEW OF SYSTEMS: .   Respiratory: Negative for shortness of breath. Cardiovascular: Negative for chest pain, palpitations  Gastrointestinal: Negative for blood in stool, abdominal distention, nausea, vomiting, abdominal pain, diarrhea,constipation. Neurological: Negative for speech difficulty, weakness and light-headedness, dizziness, tremors, sleepiness  Psychiatric/Behavioral: Negative for suicidal ideas, hallucinations, behavioral problems, self-injury, decreased concentration/cognition, agitation, confusion. PHYSICAL EXAM:   Nursing note and vitals reviewed. /68   Pulse 72   Wt 257 lb (116.6 kg)   BMI 36.88 kg/m²   General Appearance: Patient is well nourished, well developed, well groomed and in no acute distress. Skin: Skin is warm and dry, good turgor . No rash or lesions noted. He is not diaphoretic. Pulmonary/Chest: Effort normal. No respiratory distress or use of accessory muscles. Auscultation revealing normal air entry. He does not have wheezes in the lung fields.  He has no rales. Cardiovascular: Normal rate, regular rhythm, normal heart sounds, and does not have murmur. Exam reveals no gallop and no friction rub. Musculoskeletal / Extremities: Range of motion is normal. Gait is normal, assistive devices use: none. He exhibits edema: none, and no tenderness. Neurological/Psychiatric:He is alert and oriented to person, place, and time. Coordination is  normal.   Judgement and Insight is normal  His mood is Appropriate and affect is Neutral/Euthymic(normal) . His behavior is normal.   thought content normal.        IMPRESSION:     1. Narcotic abuse ([facility]    2. Failed back surgical syndrome    3. Chronic pain syndrome    4. Failed neck syndrome    5. Fibromyalgia    6. Substance abuse ([facility] [address] [facility]    7. Primary insomnia        PLAN:  Informed verbal consent was obtained. -[place] record was obtained and reviewed  for the last one year and no indicators of drug misuse  were found. Any other controlled substance prescriptions  seen on the record have been accounted for, I am aware of the patient receiving these medications. Edin Aguayo OAR[place] record will be rechecked as part of office protocol.    -Urine drug screen with GC/MS confirmation for opiates and drugs of abuse was reviewed and the results are negative for drugs of abuse and the prescribed medications were absent.   -Discharge protocol initiated. Patient was offered ongoing care with adjuvant medications and neuromodulators to control pain but with no opioids or benzodiazepines. Patient was given a list of pain management physicians. If indicated patient will be given a 30 day supply of medications and was told that I will continue to treat He for next 30 days, after which He will have to follow up with another physician. Patient was again educated about narcotic misuse/abuse and risks of abuse/misuse including death were discussed with patient. Referral to an addictionologist was also discussed with the patient.  He was also asked to taper off His  medications if unable to follow up with another physician in the next 30 days. -Wants to continue with see me   -Will continue with Lyrica and Pamelor   -Discussed use, benefit, and side effects of prescribed medications. Barriers to medication compliance addressed. All patient questions answered. Pt voiced understanding.    -Advised to use Celebrex as needed   -Khushbu exercises as advised   -He was advised to increase fluids ( 5-7  glasses of fluid daily), limit caffeine, avoid cheese products, increase dietary fiber, increase activity and exercise as tolerated and relax regularly and enjoy meals   -Follow up regards to [facility]   -Order tens unit   -Discontinue opioids after today's visit   Mr. Viry Haney will be prescribed  the medications  listed below which are for treatment of his presenting  medical problems which for this visit include:   Diagnoses of Failed back surgical syndrome, Chronic pain syndrome, Failed neck syndrome, Fibromyalgia, Substance abuse ([address] Primary insomnia, and Narcotic abuse ([facility] were pertinent to this visit. Medications/orders associated with this visit:    Current Outpatient Medications   Medication Sig Dispense Refill    oxyCODONE-acetaminophen (PERCOCET) 7.5-325 MG per tablet Take 1 tablet by mouth every 6 hours as needed for Pain (Max 4 per day) for up to 28 days. 112 tablet 0    celecoxib (CELEBREX) 200 MG capsule Take 1 capsule by mouth daily 30 capsule 0    pregabalin (LYRICA) 75 MG capsule Take One capsule hs 1 week, 2 capsules hs 1 week, 1 tab capsules 2 tabs capsules 90 capsule 0    nortriptyline (PAMELOR) 25 MG capsule Take 1-2 capsules by mouth nightly 60 capsule 0    butalbital-acetaminophen-caffeine (FIORICET, ESGIC) -40 MG per tablet Take 1 tablet by mouth every 4 hours as needed for Headaches 15 tablet 0    aspirin EC 81 MG EC tablet Take 1 tablet by mouth daily 30 tablet 0    Tens Unit MISC by Does not apply route Use as directed.  1 each 0 LISINOPRIL PO Take by mouth      UNKNOWN TO PATIENT       Gabapentin, Once-Daily, 600 MG TABS Take 600 mg by mouth 3 times daily for 30 days. . 90 tablet 0     No current facility-administered medications for this visit. Goals of current treatment regimen include improvement in pain, restoration of functioning- with focus on improvement in physical performance, general activity, work or disability,emotional distress, health care utilization and  decreased medication consumption. Will continue to monitor progress towards achieving/maintaining therapeutic goals with special emphasis on  1. Improvement in perceived interfernce  of pain with ADL's. Ability to do home exercises independently. Ability to do household chores indoor and/or outdoor work and social and leisure activities. To increase flexibility/ROM, strength and endurance. Improve psychosocial and physical functioning.- he is showing progression towards this treatment goal with the current regimen. 2. Improving sleep to 6-7 hours a night. Improve mood/ anxiety and depression symptoms such as crying spells, low energy, problems with concentration, motivation.- he is showing progression towards this treatment goal with the current regimen. 3. Reduction of reliance on opioid analgesia/more appropriate opioid use. - he is showing progression towards this treatment goal with the current regimen. Risks and benefits of the medications and other alternative treatments have been/were  discussed with the patient. Any questions on the  common side effects of these medications were also answered. He was advised against drinking alcohol with the narcotic pain medicines, advised against driving or handling machinery when  starting or adjusting the dose of medicines, feeling groggy or drowsy, or if having any cognitive issues related to the current medications.  Heis fully aware of the risk of overdose and death, if medicines are misused and not taken as prescribed. If he develops new symptoms or if the symptoms worsen, he was told to call the office. .  Thank you for allowing me to participate in the care of this patient.     Hollis Ruiz MD    Cc: Dr.Mt Darin Johnson

## 2022-11-02 NOTE — LETTER
[address]  [address] [facility] [address]  Dept: [phone]  Dept Fax: [phone]  [facility]: [phone]      11/2/2022    Dear: Chicho Perez     I find it necessary to inform you that I must withdraw my professional commitment to you as a Pain management  physician due to a breakdown in the doctor/patient relationship. It is essential that you continue to receive medical care for your condition. Therefore, I recommend you make immediate arrangements with another physician to provide the needed care. For emergency medical services, please go to the nearest emergency room for treatment. If you wish, I will continue to treat your urgent medical needs which may develop for the following thirty (30) days from today's date. Enclosed is a form authorizing me to release a copy of your medical records to your new treating physician. I will forward your records promptly upon receipt of this form, signed by you, with completed name and address of the physician to receive your records. If you have any questions regarding the contents of this letter, you may reach me at my office during normal business hours.     Respectfully,        Farzad Chavez MD

## 2023-03-14 ENCOUNTER — HOSPITAL ENCOUNTER (OUTPATIENT)
Dept: WOMENS IMAGING | Age: 58
Discharge: HOME | End: 2023-03-14
Payer: MEDICARE

## 2023-03-14 ENCOUNTER — HOSPITAL ENCOUNTER (OUTPATIENT)
Dept: ULTRASOUND IMAGING | Age: 58
Discharge: HOME | End: 2023-03-14
Payer: MEDICARE

## 2023-03-14 DIAGNOSIS — N63.0 MASS OF BREAST, UNSPECIFIED LATERALITY: ICD-10-CM

## 2023-03-14 DIAGNOSIS — D23.5: ICD-10-CM

## 2023-03-14 PROCEDURE — G0279 TOMOSYNTHESIS, MAMMO: HCPCS

## 2023-03-14 PROCEDURE — 76642 ULTRASOUND BREAST LIMITED: CPT

## 2023-06-16 ENCOUNTER — APPOINTMENT (OUTPATIENT)
Dept: CT IMAGING | Age: 58
End: 2023-06-16
Payer: MEDICARE

## 2023-06-16 ENCOUNTER — HOSPITAL ENCOUNTER (EMERGENCY)
Age: 58
Discharge: HOME | End: 2023-06-17
Attending: EMERGENCY MEDICINE
Payer: MEDICARE

## 2023-06-16 DIAGNOSIS — R10.9 FLANK PAIN: Primary | ICD-10-CM

## 2023-06-16 DIAGNOSIS — R03.0 ELEVATED BLOOD PRESSURE READING: ICD-10-CM

## 2023-06-16 DIAGNOSIS — R31.29 MICROSCOPIC HEMATURIA: ICD-10-CM

## 2023-06-16 LAB
ALBUMIN SERPL-MCNC: 4.6 G/DL (ref 3.4–5)
ALBUMIN/GLOB SERPL: 1.6 {RATIO} (ref 1.1–2.2)
ALP SERPL-CCNC: 88 U/L (ref 40–129)
ALT SERPL-CCNC: 22 U/L (ref 10–40)
ANION GAP SERPL CALCULATED.3IONS-SCNC: 8 MMOL/L (ref 3–16)
AST SERPL-CCNC: 16 U/L (ref 15–37)
BACTERIA URNS QL MICRO: ABNORMAL /HPF
BASOPHILS # BLD: 0 K/UL (ref 0–0.2)
BASOPHILS NFR BLD: 0.3 %
BILIRUB SERPL-MCNC: 0.4 MG/DL (ref 0–1)
BILIRUB UR QL STRIP.AUTO: NEGATIVE
BUN SERPL-MCNC: 14 MG/DL (ref 7–20)
CALCIUM SERPL-MCNC: 9.8 MG/DL (ref 8.3–10.6)
CHLORIDE SERPL-SCNC: 102 MMOL/L (ref 99–110)
CLARITY UR: CLEAR
CO2 SERPL-SCNC: 29 MMOL/L (ref 21–32)
COLOR UR: YELLOW
CREAT SERPL-MCNC: 0.8 MG/DL (ref 0.9–1.3)
DEPRECATED RDW RBC AUTO: 13.2 % (ref 12.4–15.4)
EOSINOPHIL # BLD: 0 K/UL (ref 0–0.6)
EOSINOPHIL NFR BLD: 0.3 %
EPI CELLS #/AREA URNS HPF: ABNORMAL /HPF (ref 0–5)
GFR SERPLBLD CREATININE-BSD FMLA CKD-EPI: >60 ML/MIN/{1.73_M2}
GLUCOSE SERPL-MCNC: 114 MG/DL (ref 70–99)
GLUCOSE UR STRIP.AUTO-MCNC: NEGATIVE MG/DL
HCT VFR BLD AUTO: 43.8 % (ref 40.5–52.5)
HGB BLD-MCNC: 14.3 G/DL (ref 13.5–17.5)
HGB UR QL STRIP.AUTO: ABNORMAL
KETONES UR STRIP.AUTO-MCNC: NEGATIVE MG/DL
LEUKOCYTE ESTERASE UR QL STRIP.AUTO: NEGATIVE
LYMPHOCYTES # BLD: 1.4 K/UL (ref 1–5.1)
LYMPHOCYTES NFR BLD: 10.7 %
MCH RBC QN AUTO: 29.2 PG (ref 26–34)
MCHC RBC AUTO-ENTMCNC: 32.7 G/DL (ref 31–36)
MCV RBC AUTO: 89.3 FL (ref 80–100)
MONOCYTES # BLD: 0.3 K/UL (ref 0–1.3)
MONOCYTES NFR BLD: 2.6 %
MUCOUS THREADS #/AREA URNS LPF: ABNORMAL /LPF
NEUTROPHILS # BLD: 10.9 K/UL (ref 1.7–7.7)
NEUTROPHILS NFR BLD: 86.1 %
NITRITE UR QL STRIP.AUTO: NEGATIVE
PH UR STRIP.AUTO: 5.5 [PH] (ref 5–8)
PLATELET # BLD AUTO: 222 K/UL (ref 135–450)
PMV BLD AUTO: 9 FL (ref 5–10.5)
POTASSIUM SERPL-SCNC: 4.4 MMOL/L (ref 3.5–5.1)
PROT SERPL-MCNC: 7.4 G/DL (ref 6.4–8.2)
PROT UR STRIP.AUTO-MCNC: NEGATIVE MG/DL
RBC # BLD AUTO: 4.91 M/UL (ref 4.2–5.9)
RBC #/AREA URNS HPF: ABNORMAL /HPF (ref 0–4)
SODIUM SERPL-SCNC: 139 MMOL/L (ref 136–145)
SP GR UR STRIP.AUTO: 1.02 (ref 1–1.03)
UA COMPLETE W REFLEX CULTURE PNL UR: ABNORMAL
UA DIPSTICK W REFLEX MICRO PNL UR: YES
URN SPEC COLLECT METH UR: ABNORMAL
UROBILINOGEN UR STRIP-ACNC: 1 E.U./DL
WBC # BLD AUTO: 12.7 K/UL (ref 4–11)
WBC #/AREA URNS HPF: ABNORMAL /HPF (ref 0–5)

## 2023-06-16 PROCEDURE — 80053 COMPREHEN METABOLIC PANEL: CPT

## 2023-06-16 PROCEDURE — 85025 COMPLETE CBC W/AUTO DIFF WBC: CPT

## 2023-06-16 PROCEDURE — 96375 TX/PRO/DX INJ NEW DRUG ADDON: CPT

## 2023-06-16 PROCEDURE — 84484 ASSAY OF TROPONIN QUANT: CPT

## 2023-06-16 PROCEDURE — 96361 HYDRATE IV INFUSION ADD-ON: CPT

## 2023-06-16 PROCEDURE — 99284 EMERGENCY DEPT VISIT MOD MDM: CPT

## 2023-06-16 PROCEDURE — 2580000003 HC RX 258: Performed by: EMERGENCY MEDICINE

## 2023-06-16 PROCEDURE — 96372 THER/PROPH/DIAG INJ SC/IM: CPT

## 2023-06-16 PROCEDURE — 96374 THER/PROPH/DIAG INJ IV PUSH: CPT

## 2023-06-16 PROCEDURE — 74176 CT ABD & PELVIS W/O CONTRAST: CPT

## 2023-06-16 PROCEDURE — 81001 URINALYSIS AUTO W/SCOPE: CPT

## 2023-06-16 PROCEDURE — 6360000002 HC RX W HCPCS: Performed by: EMERGENCY MEDICINE

## 2023-06-16 RX ORDER — ONDANSETRON 2 MG/ML
4 INJECTION INTRAMUSCULAR; INTRAVENOUS ONCE
Status: COMPLETED | OUTPATIENT
Start: 2023-06-16 | End: 2023-06-16

## 2023-06-16 RX ORDER — KETOROLAC TROMETHAMINE 15 MG/ML
15 INJECTION, SOLUTION INTRAMUSCULAR; INTRAVENOUS ONCE
Status: COMPLETED | OUTPATIENT
Start: 2023-06-16 | End: 2023-06-16

## 2023-06-16 RX ORDER — LISINOPRIL 40 MG/1
TABLET ORAL
COMMUNITY
Start: 2023-04-03

## 2023-06-16 RX ORDER — TAMSULOSIN HYDROCHLORIDE 0.4 MG/1
CAPSULE ORAL
COMMUNITY
Start: 2023-06-07

## 2023-06-16 RX ORDER — MORPHINE SULFATE 2 MG/ML
4 INJECTION, SOLUTION INTRAMUSCULAR; INTRAVENOUS ONCE
Status: COMPLETED | OUTPATIENT
Start: 2023-06-16 | End: 2023-06-16

## 2023-06-16 RX ORDER — OXYCODONE AND ACETAMINOPHEN 10; 325 MG/1; MG/1
TABLET ORAL
COMMUNITY
Start: 2023-05-26

## 2023-06-16 RX ORDER — METOPROLOL SUCCINATE 25 MG/1
25 TABLET, EXTENDED RELEASE ORAL DAILY
COMMUNITY
Start: 2014-05-12

## 2023-06-16 RX ORDER — OXYCODONE HYDROCHLORIDE AND ACETAMINOPHEN 5; 325 MG/1; MG/1
1 TABLET ORAL ONCE
Status: COMPLETED | OUTPATIENT
Start: 2023-06-17 | End: 2023-06-17

## 2023-06-16 RX ORDER — AMLODIPINE BESYLATE 10 MG/1
TABLET ORAL
COMMUNITY
Start: 2023-05-06

## 2023-06-16 RX ORDER — DICYCLOMINE HYDROCHLORIDE 10 MG/ML
10 INJECTION INTRAMUSCULAR ONCE
Status: COMPLETED | OUTPATIENT
Start: 2023-06-17 | End: 2023-06-17

## 2023-06-16 RX ORDER — 0.9 % SODIUM CHLORIDE 0.9 %
1000 INTRAVENOUS SOLUTION INTRAVENOUS ONCE
Status: COMPLETED | OUTPATIENT
Start: 2023-06-16 | End: 2023-06-16

## 2023-06-16 RX ORDER — PREDNISONE 10 MG/1
TABLET ORAL
COMMUNITY
Start: 2021-12-14

## 2023-06-16 RX ADMIN — ONDANSETRON 4 MG: 2 INJECTION INTRAMUSCULAR; INTRAVENOUS at 22:38

## 2023-06-16 RX ADMIN — MORPHINE SULFATE 4 MG: 2 INJECTION, SOLUTION INTRAMUSCULAR; INTRAVENOUS at 22:38

## 2023-06-16 RX ADMIN — SODIUM CHLORIDE 1000 ML: 9 INJECTION, SOLUTION INTRAVENOUS at 22:35

## 2023-06-16 RX ADMIN — KETOROLAC TROMETHAMINE 15 MG: 15 INJECTION, SOLUTION INTRAMUSCULAR; INTRAVENOUS at 22:38

## 2023-06-16 ASSESSMENT — PAIN DESCRIPTION - ORIENTATION
ORIENTATION: LEFT

## 2023-06-16 ASSESSMENT — PAIN DESCRIPTION - LOCATION
LOCATION: FLANK

## 2023-06-16 ASSESSMENT — PAIN DESCRIPTION - ONSET
ONSET: SUDDEN
ONSET: PROGRESSIVE

## 2023-06-16 ASSESSMENT — PAIN - FUNCTIONAL ASSESSMENT
PAIN_FUNCTIONAL_ASSESSMENT: ACTIVITIES ARE NOT PREVENTED
PAIN_FUNCTIONAL_ASSESSMENT: 0-10
PAIN_FUNCTIONAL_ASSESSMENT: 0-10

## 2023-06-16 ASSESSMENT — PAIN DESCRIPTION - PAIN TYPE
TYPE: ACUTE PAIN
TYPE: ACUTE PAIN

## 2023-06-16 ASSESSMENT — PAIN SCALES - GENERAL
PAINLEVEL_OUTOF10: 10
PAINLEVEL_OUTOF10: 10
PAINLEVEL_OUTOF10: 7

## 2023-06-16 ASSESSMENT — PAIN DESCRIPTION - FREQUENCY
FREQUENCY: INTERMITTENT
FREQUENCY: CONTINUOUS

## 2023-06-16 ASSESSMENT — PAIN DESCRIPTION - DESCRIPTORS
DESCRIPTORS: SHARP
DESCRIPTORS: SHARP
DESCRIPTORS: SHARP;STABBING

## 2023-06-17 VITALS
SYSTOLIC BLOOD PRESSURE: 159 MMHG | TEMPERATURE: 98.7 F | WEIGHT: 249.5 LBS | RESPIRATION RATE: 14 BRPM | OXYGEN SATURATION: 100 % | DIASTOLIC BLOOD PRESSURE: 91 MMHG | HEART RATE: 87 BPM | BODY MASS INDEX: 35.8 KG/M2

## 2023-06-17 LAB — TROPONIN, HIGH SENSITIVITY: 16 NG/L (ref 0–22)

## 2023-06-17 PROCEDURE — 6370000000 HC RX 637 (ALT 250 FOR IP): Performed by: EMERGENCY MEDICINE

## 2023-06-17 PROCEDURE — 6360000002 HC RX W HCPCS: Performed by: EMERGENCY MEDICINE

## 2023-06-17 RX ORDER — METHOCARBAMOL 750 MG/1
750 TABLET, FILM COATED ORAL 3 TIMES DAILY PRN
Qty: 15 TABLET | Refills: 0 | Status: SHIPPED | OUTPATIENT
Start: 2023-06-17

## 2023-06-17 RX ADMIN — DICYCLOMINE HYDROCHLORIDE 10 MG: 10 INJECTION, SOLUTION INTRAMUSCULAR at 00:28

## 2023-06-17 RX ADMIN — OXYCODONE AND ACETAMINOPHEN 1 TABLET: 5; 325 TABLET ORAL at 00:29

## 2023-06-17 ASSESSMENT — PAIN DESCRIPTION - DESCRIPTORS
DESCRIPTORS: SHARP
DESCRIPTORS: SHARP

## 2023-06-17 ASSESSMENT — PAIN SCALES - GENERAL
PAINLEVEL_OUTOF10: 4
PAINLEVEL_OUTOF10: 6

## 2023-06-17 ASSESSMENT — PAIN DESCRIPTION - LOCATION
LOCATION: FLANK
LOCATION: FLANK

## 2023-06-17 ASSESSMENT — PAIN DESCRIPTION - FREQUENCY: FREQUENCY: INTERMITTENT

## 2023-06-17 ASSESSMENT — PAIN DESCRIPTION - ORIENTATION
ORIENTATION: LEFT
ORIENTATION: LEFT

## 2023-06-17 ASSESSMENT — PAIN - FUNCTIONAL ASSESSMENT
PAIN_FUNCTIONAL_ASSESSMENT: 0-10
PAIN_FUNCTIONAL_ASSESSMENT: ACTIVITIES ARE NOT PREVENTED

## 2023-06-17 ASSESSMENT — PAIN DESCRIPTION - PAIN TYPE: TYPE: ACUTE PAIN

## 2023-06-17 ASSESSMENT — PAIN DESCRIPTION - ONSET: ONSET: SUDDEN

## 2023-06-17 NOTE — ED PROVIDER NOTES
[address] [facility] Emergency Department      Pt Name: Kirt Harper  MRN: [id number]  [place] 1965  Date of evaluation: 6/16/2023  Provider: Loring Osgood, MD  CHIEF COMPLAINT  Chief Complaint   Patient presents with    Flank Pain     Intermittent for 3 days. HPI  Kirt Harper is a 60 y.o. male who presents because of flank pain. He has had pain on his left side for the past week and a half. It became more intense over the past 3 days. He saw a doctor and was given a prescription to try to \"break it up\". He has been trying to drink lemon water without relief. He has a history of kidney stones and has needed procedure to break them up in the past.  He denies any testicle pain. Denies any fever. Denies any change in bowel pattern. Pain feels similar to when he has Peritoneal Tendon Started As Sharp and Intermittent. REVIEW OF SYSTEMS:  No fever, no chest pain, no bowel pattern change Pertinent positives and negatives as per the HPI. All other pertinent review of systems reviewed and negative. Nursing notes reviewed. PAST MEDICAL HISTORY  Past Medical History:   Diagnosis Date    Back pain     Diverticulitis     Headache     Hypertension     Kidney calculi      SURGICAL HISTORY  Past Surgical History:   Procedure Laterality Date    BACK SURGERY      cage placed last year (2021)    [address]:  No current facility-administered medications on file prior to encounter. Current Outpatient Medications on File Prior to Encounter   Medication Sig Dispense Refill    metoprolol succinate (TOPROL XL) 25 MG extended release tablet Take 1 tablet by mouth daily      predniSONE (DELTASONE) 10 MG tablet Take this medication for 10 days.   Take 5 tablets daily for 2 days  Take 4 tablets daily for 2 days  Take 3 tablets  daily for 2 days  Take 2 tablets daily for 2 days  Take 1 tablets daily for 2 days  Then stop.      lisinopril (PRINIVIL;ZESTRIL) 40 MG tablet

## 2024-06-12 ENCOUNTER — HOSPITAL ENCOUNTER (EMERGENCY)
Age: 59
Discharge: HOME | End: 2024-06-12
Attending: EMERGENCY MEDICINE
Payer: MEDICAID

## 2024-06-12 VITALS
DIASTOLIC BLOOD PRESSURE: 97 MMHG | BODY MASS INDEX: 36.45 KG/M2 | HEART RATE: 83 BPM | OXYGEN SATURATION: 100 % | TEMPERATURE: 98.5 F | HEIGHT: 70 IN | WEIGHT: 254.63 LBS | SYSTOLIC BLOOD PRESSURE: 180 MMHG | RESPIRATION RATE: 22 BRPM

## 2024-06-12 DIAGNOSIS — R03.0 ELEVATED BLOOD PRESSURE READING: ICD-10-CM

## 2024-06-12 DIAGNOSIS — R19.7 NAUSEA VOMITING AND DIARRHEA: Primary | ICD-10-CM

## 2024-06-12 DIAGNOSIS — R11.2 NAUSEA VOMITING AND DIARRHEA: Primary | ICD-10-CM

## 2024-06-12 LAB
ALBUMIN SERPL-MCNC: 4.6 G/DL (ref 3.4–5)
ALBUMIN/GLOB SERPL: 1.5 {RATIO} (ref 1.1–2.2)
ALP SERPL-CCNC: 91 U/L (ref 40–129)
ALT SERPL-CCNC: 17 U/L (ref 10–40)
ANION GAP SERPL CALCULATED.3IONS-SCNC: 10 MMOL/L (ref 3–16)
AST SERPL-CCNC: 19 U/L (ref 15–37)
BASE EXCESS BLDV CALC-SCNC: 2.8 MMOL/L (ref -3–3)
BASOPHILS # BLD: 0 K/UL (ref 0–0.2)
BASOPHILS NFR BLD: 0.4 %
BILIRUB SERPL-MCNC: 0.4 MG/DL (ref 0–1)
BILIRUB UR QL STRIP.AUTO: ABNORMAL
BUN SERPL-MCNC: 16 MG/DL (ref 7–20)
CALCIUM SERPL-MCNC: 9.9 MG/DL (ref 8.3–10.6)
CHLORIDE SERPL-SCNC: 102 MMOL/L (ref 99–110)
CLARITY UR: CLEAR
CO2 BLDV-SCNC: 28 MMOL/L
CO2 SERPL-SCNC: 26 MMOL/L (ref 21–32)
COLOR UR: YELLOW
CREAT SERPL-MCNC: 1 MG/DL (ref 0.8–1.3)
DEPRECATED RDW RBC AUTO: 13.2 % (ref 12.4–15.4)
EOSINOPHIL # BLD: 0.3 K/UL (ref 0–0.6)
EOSINOPHIL NFR BLD: 3.8 %
GFR SERPLBLD CREATININE-BSD FMLA CKD-EPI: 86 ML/MIN/{1.73_M2}
GLUCOSE SERPL-MCNC: 126 MG/DL (ref 70–99)
GLUCOSE UR STRIP.AUTO-MCNC: NEGATIVE MG/DL
HCO3 BLDV-SCNC: 27.9 MMOL/L (ref 23–29)
HCT VFR BLD AUTO: 44 % (ref 40.5–52.5)
HGB BLD-MCNC: 14.3 G/DL (ref 13.5–17.5)
HGB UR QL STRIP.AUTO: NEGATIVE
KETONES UR STRIP.AUTO-MCNC: NEGATIVE MG/DL
LEUKOCYTE ESTERASE UR QL STRIP.AUTO: NEGATIVE
LIPASE SERPL-CCNC: 50 U/L (ref 13–60)
LYMPHOCYTES # BLD: 2.3 K/UL (ref 1–5.1)
LYMPHOCYTES NFR BLD: 30.2 %
MCH RBC QN AUTO: 28.6 PG (ref 26–34)
MCHC RBC AUTO-ENTMCNC: 32.5 G/DL (ref 31–36)
MCV RBC AUTO: 87.8 FL (ref 80–100)
MONOCYTES # BLD: 0.5 K/UL (ref 0–1.3)
MONOCYTES NFR BLD: 6.5 %
NEUTROPHILS # BLD: 4.4 K/UL (ref 1.7–7.7)
NEUTROPHILS NFR BLD: 59.1 %
NITRITE UR QL STRIP.AUTO: NEGATIVE
O2 THERAPY: NORMAL
PCO2 BLDV: 47.3 MMHG (ref 40–50)
PH BLDV: 7.38 [PH] (ref 7.35–7.45)
PH UR STRIP.AUTO: 5.5 [PH] (ref 5–8)
PLATELET # BLD AUTO: 199 K/UL (ref 135–450)
PMV BLD AUTO: 8.7 FL (ref 5–10.5)
PO2 BLDV: 31.1 MMHG (ref 25–40)
POTASSIUM SERPL-SCNC: 4.2 MMOL/L (ref 3.5–5.1)
PROT SERPL-MCNC: 7.6 G/DL (ref 6.4–8.2)
PROT UR STRIP.AUTO-MCNC: NEGATIVE MG/DL
RBC # BLD AUTO: 5.02 M/UL (ref 4.2–5.9)
SAO2 % BLDV: 58 %
SODIUM SERPL-SCNC: 138 MMOL/L (ref 136–145)
SP GR UR STRIP.AUTO: 1.02 (ref 1–1.03)
UA DIPSTICK W REFLEX MICRO PNL UR: ABNORMAL
URN SPEC COLLECT METH UR: ABNORMAL
UROBILINOGEN UR STRIP-ACNC: 0.2 E.U./DL
WBC # BLD AUTO: 7.5 K/UL (ref 4–11)

## 2024-06-12 PROCEDURE — 81003 URINALYSIS AUTO W/O SCOPE: CPT

## 2024-06-12 PROCEDURE — 82803 BLOOD GASES ANY COMBINATION: CPT

## 2024-06-12 PROCEDURE — 93005 ELECTROCARDIOGRAM TRACING: CPT | Performed by: EMERGENCY MEDICINE

## 2024-06-12 PROCEDURE — 6360000002 HC RX W HCPCS: Performed by: EMERGENCY MEDICINE

## 2024-06-12 PROCEDURE — 83690 ASSAY OF LIPASE: CPT

## 2024-06-12 PROCEDURE — 80053 COMPREHEN METABOLIC PANEL: CPT

## 2024-06-12 PROCEDURE — 99284 EMERGENCY DEPT VISIT MOD MDM: CPT

## 2024-06-12 PROCEDURE — 6370000000 HC RX 637 (ALT 250 FOR IP): Performed by: EMERGENCY MEDICINE

## 2024-06-12 PROCEDURE — 96374 THER/PROPH/DIAG INJ IV PUSH: CPT

## 2024-06-12 PROCEDURE — 85025 COMPLETE CBC W/AUTO DIFF WBC: CPT

## 2024-06-12 RX ORDER — DICYCLOMINE HYDROCHLORIDE 10 MG/1
20 CAPSULE ORAL ONCE
Status: COMPLETED | OUTPATIENT
Start: 2024-06-12 | End: 2024-06-12

## 2024-06-12 RX ORDER — DICYCLOMINE HCL 20 MG
20 TABLET ORAL 4 TIMES DAILY
Qty: 40 TABLET | Refills: 0 | Status: SHIPPED | OUTPATIENT
Start: 2024-06-12

## 2024-06-12 RX ORDER — ONDANSETRON 4 MG/1
4 TABLET, FILM COATED ORAL 3 TIMES DAILY PRN
Qty: 15 TABLET | Refills: 0 | Status: SHIPPED | OUTPATIENT
Start: 2024-06-12

## 2024-06-12 RX ORDER — ONDANSETRON 2 MG/ML
4 INJECTION INTRAMUSCULAR; INTRAVENOUS ONCE
Status: COMPLETED | OUTPATIENT
Start: 2024-06-12 | End: 2024-06-12

## 2024-06-12 RX ADMIN — ONDANSETRON 4 MG: 2 INJECTION INTRAMUSCULAR; INTRAVENOUS at 13:43

## 2024-06-12 RX ADMIN — DICYCLOMINE HYDROCHLORIDE 20 MG: 10 CAPSULE ORAL at 13:35

## 2024-06-12 ASSESSMENT — PAIN DESCRIPTION - LOCATION: LOCATION: ABDOMEN

## 2024-06-12 ASSESSMENT — PAIN SCALES - GENERAL: PAINLEVEL_OUTOF10: 6

## 2024-06-12 ASSESSMENT — PAIN DESCRIPTION - PAIN TYPE: TYPE: ACUTE PAIN

## 2024-06-12 ASSESSMENT — PAIN - FUNCTIONAL ASSESSMENT: PAIN_FUNCTIONAL_ASSESSMENT: 0-10

## 2024-06-12 NOTE — ED PROVIDER NOTES
[facility] EMERGENCY DEPARTMENT     EMERGENCY DEPARTMENT ENCOUNTER            Pt Name: Donovan Cross   MRN: [id number]   Birthdate 1965   Date of evaluation: 6/12/2024   Provider: Bradford Domínguez II, DO   PCP: Jim Johnson Healthy   Note Started: 4:27 PM EDT 6/12/24          CHIEF COMPLAINT     Chief Complaint   Patient presents with    Abdominal Pain     Abdominal pain, diarrhea, and dizziness since drinking what he though was bad liquor this weekend w/ a \"Display Only\" label.               HISTORY OF PRESENT ILLNESS:   History from : Patient   Limitations to history : None     Donovan Cross is a 59 y.o. male who presents to the emergency department complaining of abdominal discomfort, nausea, vomiting, and diarrhea.  Patient states that he went on a reggae cruise over the weekend.  Patient endorses that he was drinking heavily and that after he and his friends had finished 1 bottle though put up a second bottle of Bacardi D'usse cognac.  Patient states that it did not taste right to him but that he still drinks several double shots in spite of that.  Patient states that afterwards he examined the bottle and noted that it stated \"For display only.  Not for conception.  \" Patient states that over the last day or 2 he has had nausea, vomiting, and nonbloody diarrhea.  Occasional abdominal cramping has been noted.  Patient denies fevers, chills, or sweats.  No known sick contacts.  No discomfort at this time.    Nursing Notes were all reviewed and agreed with, or any disagreements were addressed in the HPI.     REVIEW OF SYSTEMS :    Positives and Pertinent negatives as per HPI.      MEDICAL HISTORY   has a past medical history of Back pain, Diverticulitis, Headache, Hypertension, and Kidney calculi.    Past Surgical History:   Procedure Laterality Date    BACK SURGERY      cage placed last year (2021)    CARDIAC SURGERY      INSERTABLE CARDIAC MONITOR        CURRENTMEDICATIONS       Discharge Medication List as of

## 2024-06-13 LAB
EKG ATRIAL RATE: 88 BPM
EKG DIAGNOSIS: NORMAL
EKG P AXIS: 42 DEGREES
EKG P-R INTERVAL: 150 MS
EKG Q-T INTERVAL: 356 MS
EKG QRS DURATION: 72 MS
EKG QTC CALCULATION (BAZETT): 430 MS
EKG R AXIS: -33 DEGREES
EKG T AXIS: -13 DEGREES
EKG VENTRICULAR RATE: 88 BPM

## 2025-01-28 ENCOUNTER — TRANSCRIBE ORDERS (OUTPATIENT)
Dept: ADMINISTRATIVE | Age: 60
End: 2025-01-28

## 2025-01-28 DIAGNOSIS — I47.10 SVT (SUPRAVENTRICULAR TACHYCARDIA) (HCC): ICD-10-CM

## 2025-01-28 DIAGNOSIS — I47.29 NONSUSTAINED VENTRICULAR TACHYCARDIA (HCC): ICD-10-CM

## 2025-01-28 DIAGNOSIS — R06.02 SHORTNESS OF BREATH: ICD-10-CM

## 2025-01-28 DIAGNOSIS — I50.20 SYSTOLIC HEART FAILURE, UNSPECIFIED HF CHRONICITY (HCC): ICD-10-CM

## 2025-02-12 ENCOUNTER — HOSPITAL ENCOUNTER (OUTPATIENT)
Age: 60
Discharge: HOME | End: 2025-02-14
Payer: MEDICARE

## 2025-02-12 ENCOUNTER — HOSPITAL ENCOUNTER (OUTPATIENT)
Dept: NUCLEAR MEDICINE | Age: 60
Discharge: HOME | End: 2025-02-12
Payer: MEDICARE

## 2025-02-12 DIAGNOSIS — R06.02 SHORTNESS OF BREATH: ICD-10-CM

## 2025-02-12 PROCEDURE — A9502 TC99M TETROFOSMIN: HCPCS | Performed by: FAMILY MEDICINE

## 2025-02-12 PROCEDURE — 3430000000 HC RX DIAGNOSTIC RADIOPHARMACEUTICAL: Performed by: FAMILY MEDICINE

## 2025-02-12 RX ADMIN — TETROFOSMIN 13.1 MILLICURIE: 1.38 INJECTION, POWDER, LYOPHILIZED, FOR SOLUTION INTRAVENOUS at 10:37

## 2025-02-13 ENCOUNTER — APPOINTMENT (OUTPATIENT)
Dept: NUCLEAR MEDICINE | Age: 60
End: 2025-02-13
Payer: MEDICARE

## 2025-02-13 NOTE — CARE COORDINATION
Patient called Thursday morning not feeling well and canceled. Patient will call and reschedule the test.

## 2025-03-07 ENCOUNTER — TELEPHONE (OUTPATIENT)
Dept: CARDIOLOGY CLINIC | Age: 60
End: 2025-03-07

## 2025-03-07 LAB — STRESS TARGET HR: 160 BPM

## 2025-03-07 NOTE — TELEPHONE ENCOUNTER
Received call on [facility] nurse line from nuclear medicine at [facility] that patient had Stress test Day 1 resting pictures done 2/12/25. Patient did not return for Day 2 to complete the stress test.    Stress test ordered by Dulce Cotter. Nuclear medicine will try to complete Resting pictures for MD to review, patient has not called to reschedule.